# Patient Record
Sex: FEMALE | Race: WHITE | Employment: FULL TIME | URBAN - METROPOLITAN AREA
[De-identification: names, ages, dates, MRNs, and addresses within clinical notes are randomized per-mention and may not be internally consistent; named-entity substitution may affect disease eponyms.]

---

## 2018-04-06 LAB
CHLAMYDIA, EXTERNAL: NEGATIVE
HBSAG, EXTERNAL: NEGATIVE
HIV, EXTERNAL: NORMAL
N. GONORRHEA, EXTERNAL: NEGATIVE
RUBELLA, EXTERNAL: NORMAL

## 2018-08-14 LAB
ANTIBODY SCREEN, EXTERNAL: NEGATIVE
T. PALLIDUM, EXTERNAL: NEGATIVE

## 2018-10-03 LAB — GRBS, EXTERNAL: NEGATIVE

## 2018-11-02 ENCOUNTER — HOSPITAL ENCOUNTER (INPATIENT)
Age: 28
LOS: 3 days | Discharge: HOME | End: 2018-11-05
Attending: OBSTETRICS & GYNECOLOGY | Admitting: OBSTETRICS & GYNECOLOGY
Payer: COMMERCIAL

## 2018-11-02 ENCOUNTER — ANESTHESIA EVENT (OUTPATIENT)
Dept: LABOR AND DELIVERY | Age: 28
End: 2018-11-02
Payer: COMMERCIAL

## 2018-11-02 ENCOUNTER — ANESTHESIA (OUTPATIENT)
Dept: LABOR AND DELIVERY | Age: 28
End: 2018-11-02
Payer: COMMERCIAL

## 2018-11-02 DIAGNOSIS — Z98.891 HISTORY OF CESAREAN SECTION: Primary | ICD-10-CM

## 2018-11-02 PROBLEM — O24.419 GESTATIONAL DIABETES MELLITUS (GDM): Status: ACTIVE | Noted: 2018-11-02

## 2018-11-02 LAB
ABO + RH BLD: NORMAL
ALBUMIN SERPL-MCNC: 2.6 G/DL (ref 3.5–5)
ALBUMIN/GLOB SERPL: 0.7 {RATIO} (ref 1.1–2.2)
ALP SERPL-CCNC: 170 U/L (ref 45–117)
ALT SERPL-CCNC: 17 U/L (ref 12–78)
ANION GAP SERPL CALC-SCNC: 13 MMOL/L (ref 5–15)
AST SERPL-CCNC: 20 U/L (ref 15–37)
BASOPHILS # BLD: 0.1 K/UL (ref 0–0.1)
BASOPHILS NFR BLD: 1 % (ref 0–1)
BILIRUB SERPL-MCNC: 0.2 MG/DL (ref 0.2–1)
BLOOD GROUP ANTIBODIES SERPL: NORMAL
BUN SERPL-MCNC: 7 MG/DL (ref 6–20)
BUN/CREAT SERPL: 10 (ref 12–20)
CALCIUM SERPL-MCNC: 8.5 MG/DL (ref 8.5–10.1)
CHLORIDE SERPL-SCNC: 104 MMOL/L (ref 97–108)
CO2 SERPL-SCNC: 21 MMOL/L (ref 21–32)
CREAT SERPL-MCNC: 0.71 MG/DL (ref 0.55–1.02)
DIFFERENTIAL METHOD BLD: ABNORMAL
EOSINOPHIL # BLD: 0.4 K/UL (ref 0–0.4)
EOSINOPHIL NFR BLD: 4 % (ref 0–7)
ERYTHROCYTE [DISTWIDTH] IN BLOOD BY AUTOMATED COUNT: 15 % (ref 11.5–14.5)
GLOBULIN SER CALC-MCNC: 3.6 G/DL (ref 2–4)
GLUCOSE SERPL-MCNC: 83 MG/DL (ref 65–100)
HCT VFR BLD AUTO: 33 % (ref 35–47)
HGB BLD-MCNC: 11.1 G/DL (ref 11.5–16)
IMM GRANULOCYTES # BLD: 0.1 K/UL (ref 0–0.04)
IMM GRANULOCYTES NFR BLD AUTO: 1 % (ref 0–0.5)
LYMPHOCYTES # BLD: 1.7 K/UL (ref 0.8–3.5)
LYMPHOCYTES NFR BLD: 16 % (ref 12–49)
MCH RBC QN AUTO: 30.5 PG (ref 26–34)
MCHC RBC AUTO-ENTMCNC: 33.6 G/DL (ref 30–36.5)
MCV RBC AUTO: 90.7 FL (ref 80–99)
MONOCYTES # BLD: 0.8 K/UL (ref 0–1)
MONOCYTES NFR BLD: 8 % (ref 5–13)
NEUTS SEG # BLD: 7.3 K/UL (ref 1.8–8)
NEUTS SEG NFR BLD: 71 % (ref 32–75)
NRBC # BLD: 0 K/UL (ref 0–0.01)
NRBC BLD-RTO: 0 PER 100 WBC
PLATELET # BLD AUTO: 226 K/UL (ref 150–400)
PMV BLD AUTO: 9.9 FL (ref 8.9–12.9)
POTASSIUM SERPL-SCNC: 3.9 MMOL/L (ref 3.5–5.1)
PROT SERPL-MCNC: 6.2 G/DL (ref 6.4–8.2)
RBC # BLD AUTO: 3.64 M/UL (ref 3.8–5.2)
SODIUM SERPL-SCNC: 138 MMOL/L (ref 136–145)
SPECIMEN EXP DATE BLD: NORMAL
WBC # BLD AUTO: 10.4 K/UL (ref 3.6–11)

## 2018-11-02 PROCEDURE — 65270000029 HC RM PRIVATE

## 2018-11-02 PROCEDURE — 36415 COLL VENOUS BLD VENIPUNCTURE: CPT | Performed by: OBSTETRICS & GYNECOLOGY

## 2018-11-02 PROCEDURE — 77010026065 HC OXYGEN MINIMUM MEDICAL AIR: Performed by: OBSTETRICS & GYNECOLOGY

## 2018-11-02 PROCEDURE — 77030018836 HC SOL IRR NACL ICUM -A

## 2018-11-02 PROCEDURE — 77030034850

## 2018-11-02 PROCEDURE — 74011250636 HC RX REV CODE- 250/636: Performed by: ANESTHESIOLOGY

## 2018-11-02 PROCEDURE — 77030008459 HC STPLR SKN COOP -B

## 2018-11-02 PROCEDURE — 76060000078 HC EPIDURAL ANESTHESIA: Performed by: OBSTETRICS & GYNECOLOGY

## 2018-11-02 PROCEDURE — 76010000392 HC C SECN EA ADDL 0.5 HR: Performed by: OBSTETRICS & GYNECOLOGY

## 2018-11-02 PROCEDURE — 74011000258 HC RX REV CODE- 258: Performed by: OBSTETRICS & GYNECOLOGY

## 2018-11-02 PROCEDURE — 74011250636 HC RX REV CODE- 250/636

## 2018-11-02 PROCEDURE — 74011250636 HC RX REV CODE- 250/636: Performed by: OBSTETRICS & GYNECOLOGY

## 2018-11-02 PROCEDURE — 80053 COMPREHEN METABOLIC PANEL: CPT | Performed by: OBSTETRICS & GYNECOLOGY

## 2018-11-02 PROCEDURE — 10907ZC DRAINAGE OF AMNIOTIC FLUID, THERAPEUTIC FROM PRODUCTS OF CONCEPTION, VIA NATURAL OR ARTIFICIAL OPENING: ICD-10-PCS | Performed by: OBSTETRICS & GYNECOLOGY

## 2018-11-02 PROCEDURE — 75410000003 HC RECOV DEL/VAG/CSECN EA 0.5 HR: Performed by: OBSTETRICS & GYNECOLOGY

## 2018-11-02 PROCEDURE — 88307 TISSUE EXAM BY PATHOLOGIST: CPT

## 2018-11-02 PROCEDURE — 77030018809 HC RETRCTR ALXSO DISP AMR -B

## 2018-11-02 PROCEDURE — 86900 BLOOD TYPING SEROLOGIC ABO: CPT | Performed by: OBSTETRICS & GYNECOLOGY

## 2018-11-02 PROCEDURE — 77030007866 HC KT SPN ANES BBMI -B: Performed by: NURSE ANESTHETIST, CERTIFIED REGISTERED

## 2018-11-02 PROCEDURE — 74011000250 HC RX REV CODE- 250

## 2018-11-02 PROCEDURE — 76010000391 HC C SECN FIRST 1 HR: Performed by: OBSTETRICS & GYNECOLOGY

## 2018-11-02 PROCEDURE — 77030032490 HC SLV COMPR SCD KNE COVD -B

## 2018-11-02 PROCEDURE — 85025 COMPLETE CBC W/AUTO DIFF WBC: CPT | Performed by: OBSTETRICS & GYNECOLOGY

## 2018-11-02 RX ORDER — DOCUSATE SODIUM 100 MG/1
100 CAPSULE, LIQUID FILLED ORAL 2 TIMES DAILY
Status: DISCONTINUED | OUTPATIENT
Start: 2018-11-02 | End: 2018-11-05 | Stop reason: HOSPADM

## 2018-11-02 RX ORDER — KETOROLAC TROMETHAMINE 30 MG/ML
30 INJECTION, SOLUTION INTRAMUSCULAR; INTRAVENOUS
Status: DISPENSED | OUTPATIENT
Start: 2018-11-02 | End: 2018-11-03

## 2018-11-02 RX ORDER — FENTANYL CITRATE 50 UG/ML
INJECTION, SOLUTION INTRAMUSCULAR; INTRAVENOUS AS NEEDED
Status: DISCONTINUED | OUTPATIENT
Start: 2018-11-02 | End: 2018-11-02 | Stop reason: HOSPADM

## 2018-11-02 RX ORDER — LIDOCAINE HYDROCHLORIDE 10 MG/ML
INJECTION, SOLUTION EPIDURAL; INFILTRATION; INTRACAUDAL; PERINEURAL
Status: COMPLETED | OUTPATIENT
Start: 2018-11-02 | End: 2018-11-02

## 2018-11-02 RX ORDER — MORPHINE SULFATE 0.5 MG/ML
INJECTION, SOLUTION EPIDURAL; INTRATHECAL; INTRAVENOUS AS NEEDED
Status: DISCONTINUED | OUTPATIENT
Start: 2018-11-02 | End: 2018-11-02 | Stop reason: HOSPADM

## 2018-11-02 RX ORDER — DIPHENHYDRAMINE HCL 25 MG
25 CAPSULE ORAL
Status: DISCONTINUED | OUTPATIENT
Start: 2018-11-02 | End: 2018-11-05 | Stop reason: HOSPADM

## 2018-11-02 RX ORDER — OXYCODONE AND ACETAMINOPHEN 5; 325 MG/1; MG/1
1 TABLET ORAL
Status: DISCONTINUED | OUTPATIENT
Start: 2018-11-02 | End: 2018-11-05 | Stop reason: HOSPADM

## 2018-11-02 RX ORDER — PHENYLEPHRINE HCL IN 0.9% NACL 0.4MG/10ML
SYRINGE (ML) INTRAVENOUS AS NEEDED
Status: DISCONTINUED | OUTPATIENT
Start: 2018-11-02 | End: 2018-11-02 | Stop reason: HOSPADM

## 2018-11-02 RX ORDER — CLINDAMYCIN PHOSPHATE 900 MG/50ML
900 INJECTION, SOLUTION INTRAVENOUS ONCE
Status: COMPLETED | OUTPATIENT
Start: 2018-11-02 | End: 2018-11-02

## 2018-11-02 RX ORDER — SODIUM CHLORIDE, SODIUM LACTATE, POTASSIUM CHLORIDE, CALCIUM CHLORIDE 600; 310; 30; 20 MG/100ML; MG/100ML; MG/100ML; MG/100ML
1000 INJECTION, SOLUTION INTRAVENOUS CONTINUOUS
Status: DISCONTINUED | OUTPATIENT
Start: 2018-11-02 | End: 2018-11-02 | Stop reason: HOSPADM

## 2018-11-02 RX ORDER — SODIUM CHLORIDE 0.9 % (FLUSH) 0.9 %
5-10 SYRINGE (ML) INJECTION EVERY 8 HOURS
Status: DISCONTINUED | OUTPATIENT
Start: 2018-11-02 | End: 2018-11-05 | Stop reason: HOSPADM

## 2018-11-02 RX ORDER — IBUPROFEN 800 MG/1
800 TABLET ORAL EVERY 8 HOURS
Status: DISCONTINUED | OUTPATIENT
Start: 2018-11-02 | End: 2018-11-05 | Stop reason: HOSPADM

## 2018-11-02 RX ORDER — SIMETHICONE 80 MG
80 TABLET,CHEWABLE ORAL AS NEEDED
Status: DISCONTINUED | OUTPATIENT
Start: 2018-11-02 | End: 2018-11-05 | Stop reason: HOSPADM

## 2018-11-02 RX ORDER — OXYTOCIN/RINGER'S LACTATE 20/1000 ML
125-500 PLASTIC BAG, INJECTION (ML) INTRAVENOUS ONCE
Status: ACTIVE | OUTPATIENT
Start: 2018-11-02 | End: 2018-11-03

## 2018-11-02 RX ORDER — OXYTOCIN 10 [USP'U]/ML
INJECTION, SOLUTION INTRAMUSCULAR; INTRAVENOUS AS NEEDED
Status: DISCONTINUED | OUTPATIENT
Start: 2018-11-02 | End: 2018-11-02 | Stop reason: HOSPADM

## 2018-11-02 RX ORDER — ONDANSETRON 2 MG/ML
4 INJECTION INTRAMUSCULAR; INTRAVENOUS
Status: DISCONTINUED | OUTPATIENT
Start: 2018-11-02 | End: 2018-11-05 | Stop reason: HOSPADM

## 2018-11-02 RX ORDER — SODIUM CHLORIDE 0.9 % (FLUSH) 0.9 %
5-10 SYRINGE (ML) INJECTION AS NEEDED
Status: DISCONTINUED | OUTPATIENT
Start: 2018-11-02 | End: 2018-11-02 | Stop reason: HOSPADM

## 2018-11-02 RX ORDER — DIPHENHYDRAMINE HYDROCHLORIDE 50 MG/ML
25 INJECTION, SOLUTION INTRAMUSCULAR; INTRAVENOUS
Status: ACTIVE | OUTPATIENT
Start: 2018-11-02 | End: 2018-11-03

## 2018-11-02 RX ORDER — BUPIVACAINE HYDROCHLORIDE 7.5 MG/ML
INJECTION, SOLUTION EPIDURAL; RETROBULBAR AS NEEDED
Status: DISCONTINUED | OUTPATIENT
Start: 2018-11-02 | End: 2018-11-02 | Stop reason: HOSPADM

## 2018-11-02 RX ORDER — SODIUM CHLORIDE, SODIUM LACTATE, POTASSIUM CHLORIDE, CALCIUM CHLORIDE 600; 310; 30; 20 MG/100ML; MG/100ML; MG/100ML; MG/100ML
125 INJECTION, SOLUTION INTRAVENOUS CONTINUOUS
Status: DISCONTINUED | OUTPATIENT
Start: 2018-11-02 | End: 2018-11-05 | Stop reason: HOSPADM

## 2018-11-02 RX ORDER — SODIUM CHLORIDE 0.9 % (FLUSH) 0.9 %
5-10 SYRINGE (ML) INJECTION AS NEEDED
Status: DISCONTINUED | OUTPATIENT
Start: 2018-11-02 | End: 2018-11-05 | Stop reason: HOSPADM

## 2018-11-02 RX ORDER — ONDANSETRON 2 MG/ML
INJECTION INTRAMUSCULAR; INTRAVENOUS AS NEEDED
Status: DISCONTINUED | OUTPATIENT
Start: 2018-11-02 | End: 2018-11-02 | Stop reason: HOSPADM

## 2018-11-02 RX ORDER — NALOXONE HYDROCHLORIDE 0.4 MG/ML
0.4 INJECTION, SOLUTION INTRAMUSCULAR; INTRAVENOUS; SUBCUTANEOUS AS NEEDED
Status: DISCONTINUED | OUTPATIENT
Start: 2018-11-02 | End: 2018-11-05 | Stop reason: HOSPADM

## 2018-11-02 RX ORDER — SODIUM CHLORIDE 0.9 % (FLUSH) 0.9 %
5-10 SYRINGE (ML) INJECTION EVERY 8 HOURS
Status: DISCONTINUED | OUTPATIENT
Start: 2018-11-02 | End: 2018-11-02 | Stop reason: HOSPADM

## 2018-11-02 RX ADMIN — SODIUM CHLORIDE, SODIUM LACTATE, POTASSIUM CHLORIDE, AND CALCIUM CHLORIDE 1000 ML: 600; 310; 30; 20 INJECTION, SOLUTION INTRAVENOUS at 10:30

## 2018-11-02 RX ADMIN — Medication 80 MCG: at 12:19

## 2018-11-02 RX ADMIN — LIDOCAINE HYDROCHLORIDE 3 MG: 10 INJECTION, SOLUTION EPIDURAL; INFILTRATION; INTRACAUDAL; PERINEURAL at 12:06

## 2018-11-02 RX ADMIN — FENTANYL CITRATE 10 MCG: 50 INJECTION, SOLUTION INTRAMUSCULAR; INTRAVENOUS at 12:00

## 2018-11-02 RX ADMIN — ONDANSETRON 4 MG: 2 INJECTION INTRAMUSCULAR; INTRAVENOUS at 12:33

## 2018-11-02 RX ADMIN — MORPHINE SULFATE 200 MCG: 0.5 INJECTION, SOLUTION EPIDURAL; INTRATHECAL; INTRAVENOUS at 12:00

## 2018-11-02 RX ADMIN — KETOROLAC TROMETHAMINE 30 MG: 30 INJECTION, SOLUTION INTRAMUSCULAR at 15:36

## 2018-11-02 RX ADMIN — SODIUM CHLORIDE, SODIUM LACTATE, POTASSIUM CHLORIDE, AND CALCIUM CHLORIDE 125 ML/HR: 600; 310; 30; 20 INJECTION, SOLUTION INTRAVENOUS at 20:27

## 2018-11-02 RX ADMIN — Medication 80 MCG: at 13:00

## 2018-11-02 RX ADMIN — Medication 80 MCG: at 12:04

## 2018-11-02 RX ADMIN — BUPIVACAINE HYDROCHLORIDE 1.4 ML: 7.5 INJECTION, SOLUTION EPIDURAL; RETROBULBAR at 12:00

## 2018-11-02 RX ADMIN — CLINDAMYCIN PHOSPHATE 900 MG: 900 INJECTION, SOLUTION INTRAVENOUS at 11:50

## 2018-11-02 RX ADMIN — Medication 80 MCG: at 12:12

## 2018-11-02 RX ADMIN — Medication 80 MCG: at 13:08

## 2018-11-02 RX ADMIN — KETOROLAC TROMETHAMINE 30 MG: 30 INJECTION, SOLUTION INTRAMUSCULAR at 21:33

## 2018-11-02 RX ADMIN — SODIUM CHLORIDE, SODIUM LACTATE, POTASSIUM CHLORIDE, AND CALCIUM CHLORIDE 1000 ML: 600; 310; 30; 20 INJECTION, SOLUTION INTRAVENOUS at 11:32

## 2018-11-02 RX ADMIN — SODIUM CHLORIDE, SODIUM LACTATE, POTASSIUM CHLORIDE, AND CALCIUM CHLORIDE: 600; 310; 30; 20 INJECTION, SOLUTION INTRAVENOUS at 12:49

## 2018-11-02 RX ADMIN — Medication 80 MCG: at 12:25

## 2018-11-02 RX ADMIN — GENTAMICIN SULFATE 262 MG: 40 INJECTION, SOLUTION INTRAMUSCULAR; INTRAVENOUS at 12:03

## 2018-11-02 RX ADMIN — SODIUM CHLORIDE, SODIUM LACTATE, POTASSIUM CHLORIDE, AND CALCIUM CHLORIDE 1000 ML: 600; 310; 30; 20 INJECTION, SOLUTION INTRAVENOUS at 10:28

## 2018-11-02 RX ADMIN — Medication 80 MCG: at 12:05

## 2018-11-02 RX ADMIN — OXYTOCIN 30 UNITS: 10 INJECTION, SOLUTION INTRAMUSCULAR; INTRAVENOUS at 12:31

## 2018-11-02 NOTE — ANESTHESIA PROCEDURE NOTES
Spinal Block Start time: 11/2/2018 11:54 AM 
End time: 11/2/2018 12:02 PM 
Performed by: Wm Francois CRNA Authorized by: Jeff Ferrell MD  
 
Pre-procedure: 
Preanesthetic Checklist: patient identified, risks and benefits discussed, anesthesia consent, site marked, patient being monitored and timeout performed Timeout Time: 11:54 Spinal Block:  
Patient Position:  Seated Prep Region:  Lumbar Prep: DuraPrep Location:  L4-5 Technique:  Single shot Needle:  
Needle Type:  Pencan Needle Gauge:  25 G Attempts:  1 Events: CSF confirmed, no blood with aspiration and no paresthesia Assessment: 
Insertion:  Uncomplicated Patient tolerance:  Patient tolerated the procedure well with no immediate complications

## 2018-11-02 NOTE — ANESTHESIA POSTPROCEDURE EVALUATION
Procedure(s):  SECTION. Anesthesia Post Evaluation Patient location during evaluation: bedside Patient participation: complete - patient participated Level of consciousness: awake Pain management: adequate Airway patency: patent Anesthetic complications: no 
Cardiovascular status: acceptable Respiratory status: acceptable Hydration status: acceptable Visit Vitals /77 Pulse 75 Temp 36.4 °C (97.5 °F) Resp 20 Ht 5' 3\" (1.6 m) Wt 78 kg (172 lb) SpO2 100% Breastfeeding? Unknown BMI 30.47 kg/m²

## 2018-11-02 NOTE — ROUTINE PROCESS
TRANSFER - IN REPORT: 
 
Verbal report received from Lux Malin RN(name) on Светлана Purdy  being received from L&D(unit) for routine progression of care Report consisted of patients Situation, Background, Assessment and  
Recommendations(SBAR). Information from the following report(s) SBAR, Kardex, Intake/Output, MAR and Recent Results was reviewed with the receiving nurse. Opportunity for questions and clarification was provided. Assessment completed upon patients arrival to unit and care assumed.

## 2018-11-02 NOTE — PROGRESS NOTES
Nutrition Brief Note: 
 
Received [facility] referral for gestational DM. Patient admitted for  delivery. Pre-op preparations started and patient received spinal block. Per H&P, BG levels well controlled this pregnancy. Patient is preparing for delivery so nutrition interventions for gestational DM are not indicated at this time. Will continue to follow per protocol. Amador Stanton, RD Pager [phone] Office [phone]
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
## 2018-11-02 NOTE — ANESTHESIA PREPROCEDURE EVALUATION
Anesthetic History No history of anesthetic complications Review of Systems / Medical History Patient summary reviewed, nursing notes reviewed and pertinent labs reviewed Pulmonary Within defined limits Neuro/Psych Within defined limits Cardiovascular Within defined limits GI/Hepatic/Renal 
Within defined limits Endo/Other Within defined limits Other Findings Comments: 39weeks 4 days, GDM, repeat C section Physical Exam 
 
Airway Mallampati: II 
 
Neck ROM: normal range of motion Mouth opening: Normal 
 
 Cardiovascular Rhythm: regular Rate: normal 
 
 
 
 Dental 
No notable dental hx Pulmonary Breath sounds clear to auscultation Abdominal 
GI exam deferred Other Findings Anesthetic Plan ASA: 2 Anesthesia type: spinal 
 
 
 
 
 
Anesthetic plan and risks discussed with: Patient and Family

## 2018-11-02 NOTE — L&D DELIVERY NOTE
Delivery Summary  Patient: Dee Collado             Circumcision:   desires  Additional Delivery Comments - Uncomplicated repeat  section - see operative note for details. Information for the patient's :  Arcola Litten, Male [[id number]]       Labor Events:    Labor: No   Rupture Date: 2018   Rupture Time: 12:30 PM   Rupture Type AROM   Amniotic Fluid Volume:      Amniotic Fluid Description: Clear None   Induction: None       Augmentation: None   Labor Events: None     Cervical Ripening:     None     Delivery Events:  Episiotomy: None   Laceration(s): None     Repaired: None    Number of Repair Packets:     Suture Type and Size: None     Estimated Blood Loss (ml):  ml       Delivery Date: 2018    Delivery Time: 12:30 PM  Delivery Type: , Low Transverse  Sex:  Male     Gestational Age: 43w3d   Delivery Clinician:  Kelly Monet  Living Status: Living   Delivery Location: L&D  OR OR #1          APGARS  One minute Five minutes Ten minutes   Skin color: 1   1        Heart rate: 2   2        Grimace: 2   2        Muscle tone: 2   2        Breathin   2        Totals: 9   9            Presentation: Vertex    Position:        Resuscitation Method:  Tactile Stimulation;Suctioning-bulb     Meconium Stained: None      Cord Information: 3 Vessels  Complications: None  Cord around:    Delayed cord clamping?  Yes  Cord clamped date/time:2018 12:31 PM  Disposition of Cord Blood: Lab    Blood Gases Sent?: No    Placenta:  Date/Time: 2018 12:33 PM  Removal: Manual Removal      Appearance: Normal;Intact     [place] Measurements:  Birth Weight: 4.05 kg      Birth Length: 48.3 cm      Head Circumference: 37 cm      Chest Circumference: 34.5 cm     Abdominal Girth: 35.5 cm    Other Providers:   Fozia CASSIDY KIMBERLY;YINKA FOOTE;CASSIDY CARLSON;NATHAN RIDLEY;PARAM THOMAS, Obstetrician;Primary Nurse;Primary [place] Nurse;Crna;Tech;Tech;Charge Nurse           Cord pH:  none    Episiotomy: None   Laceration(s): None     Estimated Blood Loss (ml): 800 mL    Labor Events  Method: None      Augmentation: None   Cervical Ripening:       None        Hospital Problems  Date Reviewed: 2018          Codes Class Noted POA    Gestational diabetes mellitus (GDM) ICD-10-CM: O24.419  ICD-9-CM: 648.80  2018 Unknown        History of  section ICD-10-CM: Z98.891  ICD-9-CM: V45.89  2018 Unknown        Pregnancy ICD-10-CM: Z34.90  ICD-9-CM: V22.2  2016 Unknown              Operative Vaginal Delivery - none    Group B Strep:   Lab Results   Component Value Date/Time    GrBStrep, External NEGATIVE 2016     Information for the patient's :  Darek Parent, Male [[id number]]   No results found for: ABORH, PCTABR, PCTDIG, BILI, ABORHEXT, ABORH    No results found for: APH, APCO2, APO2, AHCO3, ABEC, ABDC, O2ST, EPHV, PCO2V, PO2V, HCO3V, EBEV, EBDV, SITE, RSCOM

## 2018-11-02 NOTE — ROUTINE PROCESS
Bedside and Verbal shift change report given to [facility] (oncoming nurse) by Michelle Irby RNSHANTELLE (offgoing nurse). Report included the following information SBAR, Kardex, Intake/Output, MAR and Recent Results.

## 2018-11-02 NOTE — LACTATION NOTE
Problem: Lactation Care Plan Goal: *Infant latching appropriately Outcome: Progressing Towards Goal 
Mom comfortable and relaxed with breast feeding. States nursed for about 19 months. STates baby nursed for about an hour after delivery. Not seen at breast. 
  
Pt will successfully establish breastfeeding by feeding in response to early feeding cues  
or wake every 3h, will obtain deep latch, and will keep log of feedings/output. Taught to BF at hunger cues and or q 2-3 hrs and to offer 10-20 drops of hand expressed colostrum at any non-feeds.   
  
Breast Assessment Left Breast: Large Left Nipple: Everted, Intact Right Breast: Large Right Nipple: Everted, Intact Breast- Feeding Assessment Attends Breast-Feeding Classes: No 
Breast-Feeding Experience: Yes(19 months with first) Breast Trauma/Surgery: No 
Type/Quality: Good(per mom, not seen at breast) 
  
  
Goal: *Weight loss less than 10% of birth weight Outcome: Progressing Towards Goal 
  
Encouraged mom to attempt feeding with baby led feeding cues. Just as sucking on fingers, rooting, mouthing. Looking for 8-12 feedings in 24 hours. Don't limit baby at breast, allow baby to come of breast on it's own. Baby may want to feed  often and may increase number of feedings on second day of life. Skin to skin encouraged.  
  
 If baby doesn't nurse,  Mom should  hand express  10-20 drops of colostrum and drip into baby's mouth, or give to baby by finger feeding, cup feeding, or spoon feeding at least every 2-3 hours.  
  
  
Problem: Patient Education: Go to Patient Education Activity Goal: Patient/Family Education Outcome: Progressing Towards Goal 
Reviewed breastfeeding basics:  Supply and demand,  stomach size, early  Feeding cues, skin to skin, positioning and baby led latch-on, assymetrical latch with signs of good, deep latch vs shallow, feeding frequency and duration, and log sheet for tracking infant feedings and output. Breastfeeding Booklet and Warm line information given. Discussed typical  weight loss and the importance of infant weight checks with pediatrician 1-2 post discharge. 
  
Discussed with mother her plan for feeding. Reviewed the benefits of exclusive breast milk feeding during the hospital stay. Informed her of the risks of using formula to supplement in the first few days of life as well as the benefits of successful breast milk feeding; referred her to the Breastfeeding booklet about this information. She acknowledges understanding of information reviewed and states that it is her plan to breast feed her infant. Will support her choice and offer additional information as needed

## 2018-11-02 NOTE — PROGRESS NOTES
1005 Patient arrived for scheduled repeat  section. [address] on EFM at this time. 1025 IV initiated by RN. LR bolus started. [address] [facility] [address]. Nikko Maria with anesthesia at bedside for pre-op consult. [address] [facility] reviewed and signed with patient. 1051 Removed from EFM- NST reactive. 1109 Chlorhexidine wipes used over abdomen at this time. Patient performed own hair removal at home. 65 Dr. Darlene Lu at bedside for pre-op consult. 1153 Ambulatory to OR for scheduled . 1312 Transferred back to LDR room 204 for recovery. 1401 Patient doing well. Family at bedside. 1521 Patient doing well. Tolerating PO fluids and clears. Would like some crackers- has good bowel sounds. 1653 Bedside and Verbal shift change report given to NERY Faust (oncoming nurse) by Salazar Kemp RN (offgoing nurse). Report included the following information SBAR, Kardex, Procedure Summary, Intake/Output, MAR, Recent Results and Med Rec Status.

## 2018-11-02 NOTE — OP NOTES
Operative Note    Name: Gogo El Record Number: [id number]   YOB: 1990  Today's Date: 2018      Pre-operative Diagnosis:  1. H/o prior  section, desires repeat  section    2. GDMA2, insulin controlled    Post-operative Diagnosis:Same as above with extremely thin lower uterine segment noted    Operation: low transverse  section Procedure(s):   SECTION    Surgeon(s):  Blanca Woods MD    Anesthesia: Spinal    Prophylactic Antibiotics: Gentamicin and clindamycin  DVT Prophylaxis: Sequential Compression Devices         Fetal Description: nj     Birth Information:   Information for the patient's :  Rosy Zimmerman, Male [[id number]]   Delivery of a 4.05 kg Male [2] infant on 2018 at 12:30 PM. Apgars were 9 and 9. Umbilical Cord:     Umbilical Cord Events:     Placenta:  removal with  appearance. Amniotic Fluid Volume:        Amniotic Fluid Description:  Clear          Placenta:  expressed Expressed    Estimated Blood Loss (ml):   800 mL    Specimens: Placenta           Complications:  Extremely thin lower uterine segment    Procedure Detail:      After proper patient identification and consent, the patient was taken to the operating room, where spinal anesthesia was administered and found to be adequate. Hull catheter had been placed using sterile technique. The patient was prepped and draped in the normal sterile fashion. The abdomen was entered using the Pfannenstiel technique. The peritoneum was entered sharply well superior to the bladder without any apparent injury. The bladder flap was created. A low transverse uterine incision was made with the scalpel and the lower uterine segment was noted to be extremely thin, only 2mm thick, and extended with blunt finger dissection. Amniotomy was performed and the fluid was medium amount clear. The babys head was then delivered atraumatically.   The cord was clamped and cut and the baby was handed off to Nursing staff in attendance. Placenta was then removed from the uterus. The uterus was curettaged with a moist lap pad and cleared of all clots and debris. The uterine incision was closed with 0 monocryl, double layer  in running locking fashion with good hemostasis assured. Good hemostasis was again reassured. The rectus muscles were reapproximated with 2-0 vicryl. The fascia was closed with 0 Vicryl in a running fashion. Good hemostasis was assured. The skin was closed with an absorbable staple closure and steri strips. The patient tolerated the procedure well. Sponge, lap, and needle counts were correct times three and the patient and baby were taken to recovery/postpartum room in stable condition.     Nimisha Soriano MD  November 2, 2018  1:11 PM

## 2018-11-03 LAB
BASOPHILS # BLD: 0.1 K/UL (ref 0–0.1)
BASOPHILS NFR BLD: 1 % (ref 0–1)
DIFFERENTIAL METHOD BLD: ABNORMAL
EOSINOPHIL # BLD: 0.4 K/UL (ref 0–0.4)
EOSINOPHIL NFR BLD: 3 % (ref 0–7)
ERYTHROCYTE [DISTWIDTH] IN BLOOD BY AUTOMATED COUNT: 15 % (ref 11.5–14.5)
HCT VFR BLD AUTO: 32.2 % (ref 35–47)
HGB BLD-MCNC: 10.5 G/DL (ref 11.5–16)
IMM GRANULOCYTES # BLD: 0.1 K/UL (ref 0–0.04)
IMM GRANULOCYTES NFR BLD AUTO: 1 % (ref 0–0.5)
LYMPHOCYTES # BLD: 1.6 K/UL (ref 0.8–3.5)
LYMPHOCYTES NFR BLD: 14 % (ref 12–49)
MCH RBC QN AUTO: 30.3 PG (ref 26–34)
MCHC RBC AUTO-ENTMCNC: 32.6 G/DL (ref 30–36.5)
MCV RBC AUTO: 92.8 FL (ref 80–99)
MONOCYTES # BLD: 1.1 K/UL (ref 0–1)
MONOCYTES NFR BLD: 9 % (ref 5–13)
NEUTS SEG # BLD: 8.6 K/UL (ref 1.8–8)
NEUTS SEG NFR BLD: 73 % (ref 32–75)
NRBC # BLD: 0 K/UL (ref 0–0.01)
NRBC BLD-RTO: 0 PER 100 WBC
PLATELET # BLD AUTO: 195 K/UL (ref 150–400)
PMV BLD AUTO: 10.3 FL (ref 8.9–12.9)
RBC # BLD AUTO: 3.47 M/UL (ref 3.8–5.2)
WBC # BLD AUTO: 11.8 K/UL (ref 3.6–11)

## 2018-11-03 PROCEDURE — 77030018846 HC SOL IRR STRL H20 ICUM -A

## 2018-11-03 PROCEDURE — 65270000029 HC RM PRIVATE

## 2018-11-03 PROCEDURE — 74011250637 HC RX REV CODE- 250/637: Performed by: OBSTETRICS & GYNECOLOGY

## 2018-11-03 PROCEDURE — 85025 COMPLETE CBC W/AUTO DIFF WBC: CPT | Performed by: OBSTETRICS & GYNECOLOGY

## 2018-11-03 PROCEDURE — 36415 COLL VENOUS BLD VENIPUNCTURE: CPT | Performed by: OBSTETRICS & GYNECOLOGY

## 2018-11-03 RX ADMIN — SIMETHICONE CHEW TAB 80 MG 80 MG: 80 TABLET ORAL at 20:11

## 2018-11-03 RX ADMIN — IBUPROFEN 800 MG: 800 TABLET ORAL at 20:11

## 2018-11-03 RX ADMIN — OXYCODONE AND ACETAMINOPHEN 1 TABLET: 5; 325 TABLET ORAL at 20:11

## 2018-11-03 RX ADMIN — OXYCODONE AND ACETAMINOPHEN 1 TABLET: 5; 325 TABLET ORAL at 12:04

## 2018-11-03 RX ADMIN — IBUPROFEN 800 MG: 800 TABLET ORAL at 04:12

## 2018-11-03 RX ADMIN — OXYCODONE AND ACETAMINOPHEN 1 TABLET: 5; 325 TABLET ORAL at 04:14

## 2018-11-03 RX ADMIN — OXYCODONE AND ACETAMINOPHEN 1 TABLET: 5; 325 TABLET ORAL at 16:20

## 2018-11-03 RX ADMIN — DOCUSATE SODIUM 100 MG: 100 CAPSULE, LIQUID FILLED ORAL at 08:15

## 2018-11-03 RX ADMIN — OXYCODONE AND ACETAMINOPHEN 1 TABLET: 5; 325 TABLET ORAL at 08:15

## 2018-11-03 RX ADMIN — DOCUSATE SODIUM 100 MG: 100 CAPSULE, LIQUID FILLED ORAL at 20:11

## 2018-11-03 RX ADMIN — IBUPROFEN 800 MG: 800 TABLET ORAL at 12:03

## 2018-11-03 NOTE — ROUTINE PROCESS
Bedside and Verbal shift change report given to Kassandra Best RN (oncoming nurse) by Rachell Sims RN (offgoing nurse). Report given with SBAR, Kardex, Intake/Output and MAR.

## 2018-11-03 NOTE — ROUTINE PROCESS
Bedside and Verbal shift change report given to Mili Bucio RN (oncoming nurse) by Angeles Aviles RN (offgoing nurse). Report included the following information SBAR.

## 2018-11-03 NOTE — PROGRESS NOTES
Post-Operative  Day 1 [facility] Information for the patient's :  Suzanne Henderson, Male [[id number]] , Low Transverse Patient doing well without unusual complaints. She ambulates, voids and tolerates po. She desires and is on track for a POD #2 discharge. Vitals: 
Visit Vitals /59 (BP 1 Location: Right arm, BP Patient Position: At rest) Pulse 67 Temp 98 °F (36.7 °C) Resp 16 Ht 5' 3\" (1.6 m) Wt 78 kg (172 lb) SpO2 100% Breastfeeding? Unknown BMI 30.47 kg/m² Temp (24hrs), Av.9 °F (36.6 °C), Min:97.5 °F (36.4 °C), Max:98.1 °F (36.7 °C) Last 24hr Input/Output: 
 
Intake/Output Summary (Last 24 hours) at 11/3/2018 0636 Last data filed at 11/3/2018 0220 Gross per 24 hour Intake 3200 ml Output 4200 ml Net -1000 ml Exam:   
   Patient without distress. Abdomen:soft, expected mild tenderness, fundus firm @U-2 Inc: dressing c/d/i Lower extremities are no tenderness with 1+ edema. Labs:  
Lab Results Component Value Date/Time WBC 11.8 (H) 2018 05:32 AM  
 WBC 10.4 2018 10:30 AM  
 HGB 10.5 (L) 2018 05:32 AM  
 HGB 11.1 (L) 2018 10:30 AM  
 HCT 32.2 (L) 2018 05:32 AM  
 HCT 33.0 (L) 2018 10:30 AM  
 PLATELET 590  05:32 AM  
 PLATELET 801 6431 10:30 AM  
 
 
Recent Results (from the past 24 hour(s)) CBC WITH AUTOMATED DIFF Collection Time: 18 10:30 AM  
Result Value Ref Range WBC 10.4 3.6 - 11.0 K/uL  
 RBC 3.64 (L) 3.80 - 5.20 M/uL  
 HGB 11.1 (L) 11.5 - 16.0 g/dL HCT 33.0 (L) 35.0 - 47.0 % MCV 90.7 80.0 - 99.0 FL  
 MCH 30.5 26.0 - 34.0 PG  
 MCHC 33.6 30.0 - 36.5 g/dL  
 RDW 15.0 (H) 11.5 - 14.5 % PLATELET 776 584 - 204 K/uL MPV 9.9 8.9 - 12.9 FL  
 NRBC 0.0 0  WBC ABSOLUTE NRBC 0.00 0.00 - 0.01 K/uL NEUTROPHILS 71 32 - 75 % LYMPHOCYTES 16 12 - 49 % MONOCYTES 8 5 - 13 % EOSINOPHILS 4 0 - 7 % BASOPHILS 1 0 - 1 % IMMATURE GRANULOCYTES 1 (H) 0.0 - 0.5 % ABS. NEUTROPHILS 7.3 1.8 - 8.0 K/UL  
 ABS. LYMPHOCYTES 1.7 0.8 - 3.5 K/UL  
 ABS. MONOCYTES 0.8 0.0 - 1.0 K/UL  
 ABS. EOSINOPHILS 0.4 0.0 - 0.4 K/UL  
 ABS. BASOPHILS 0.1 0.0 - 0.1 K/UL  
 ABS. IMM. GRANS. 0.1 (H) 0.00 - 0.04 K/UL  
 DF AUTOMATED    
TYPE & SCREEN Collection Time: 11/02/18 10:30 AM  
Result Value Ref Range Crossmatch Expiration 11/05/2018 ABO/Rh(D) O POSITIVE Antibody screen NEG METABOLIC PANEL, COMPREHENSIVE Collection Time: 11/02/18 10:32 AM  
Result Value Ref Range Sodium 138 136 - 145 mmol/L Potassium 3.9 3.5 - 5.1 mmol/L Chloride 104 97 - 108 mmol/L  
 CO2 21 21 - 32 mmol/L Anion gap 13 5 - 15 mmol/L Glucose 83 65 - 100 mg/dL BUN 7 6 - 20 MG/DL Creatinine 0.71 0.55 - 1.02 MG/DL  
 BUN/Creatinine ratio 10 (L) 12 - 20 GFR est AA >60 >60 ml/min/1.73m2 GFR est non-AA >60 >60 ml/min/1.73m2 Calcium 8.5 8.5 - 10.1 MG/DL Bilirubin, total 0.2 0.2 - 1.0 MG/DL  
 ALT (SGPT) 17 12 - 78 U/L  
 AST (SGOT) 20 15 - 37 U/L Alk. phosphatase 170 (H) 45 - 117 U/L Protein, total 6.2 (L) 6.4 - 8.2 g/dL Albumin 2.6 (L) 3.5 - 5.0 g/dL Globulin 3.6 2.0 - 4.0 g/dL A-G Ratio 0.7 (L) 1.1 - 2.2    
CBC WITH AUTOMATED DIFF Collection Time: 11/03/18  5:32 AM  
Result Value Ref Range WBC 11.8 (H) 3.6 - 11.0 K/uL  
 RBC 3.47 (L) 3.80 - 5.20 M/uL  
 HGB 10.5 (L) 11.5 - 16.0 g/dL HCT 32.2 (L) 35.0 - 47.0 % MCV 92.8 80.0 - 99.0 FL  
 MCH 30.3 26.0 - 34.0 PG  
 MCHC 32.6 30.0 - 36.5 g/dL  
 RDW 15.0 (H) 11.5 - 14.5 % PLATELET 563 027 - 295 K/uL MPV 10.3 8.9 - 12.9 FL  
 NRBC 0.0 0  WBC ABSOLUTE NRBC 0.00 0.00 - 0.01 K/uL NEUTROPHILS 73 32 - 75 % LYMPHOCYTES 14 12 - 49 % MONOCYTES 9 5 - 13 % EOSINOPHILS 3 0 - 7 % BASOPHILS 1 0 - 1 % IMMATURE GRANULOCYTES 1 (H) 0.0 - 0.5 % ABS. NEUTROPHILS 8.6 (H) 1.8 - 8.0 K/UL  
 ABS. LYMPHOCYTES 1.6 0.8 - 3.5 K/UL ABS. MONOCYTES 1.1 (H) 0.0 - 1.0 K/UL  
 ABS. EOSINOPHILS 0.4 0.0 - 0.4 K/UL  
 ABS. BASOPHILS 0.1 0.0 - 0.1 K/UL  
 ABS. IMM. GRANS. 0.1 (H) 0.00 - 0.04 K/UL  
 DF AUTOMATED Assessment: Post-Op day 1, stable; O+/RI Plan:   1. Routine post-operative care, advance and ambulate. 2. Male infant: Circ desired and will perform POD#2 3. Patient targeting a POD#2 discharge.

## 2018-11-04 PROCEDURE — 74011250637 HC RX REV CODE- 250/637: Performed by: OBSTETRICS & GYNECOLOGY

## 2018-11-04 PROCEDURE — 65270000029 HC RM PRIVATE

## 2018-11-04 PROCEDURE — 77030036554

## 2018-11-04 RX ORDER — ONDANSETRON 4 MG/1
4 TABLET, ORALLY DISINTEGRATING ORAL
Status: DISCONTINUED | OUTPATIENT
Start: 2018-11-04 | End: 2018-11-05 | Stop reason: HOSPADM

## 2018-11-04 RX ORDER — IBUPROFEN 800 MG/1
800 TABLET ORAL EVERY 8 HOURS
Qty: 40 TAB | Refills: 0 | Status: SHIPPED | OUTPATIENT
Start: 2018-11-04

## 2018-11-04 RX ORDER — ACETAMINOPHEN 325 MG/1
650 TABLET ORAL
Status: DISCONTINUED | OUTPATIENT
Start: 2018-11-04 | End: 2018-11-05 | Stop reason: HOSPADM

## 2018-11-04 RX ORDER — OXYCODONE AND ACETAMINOPHEN 5; 325 MG/1; MG/1
1 TABLET ORAL
Qty: 20 TAB | Refills: 0 | Status: SHIPPED | OUTPATIENT
Start: 2018-11-04

## 2018-11-04 RX ADMIN — OXYCODONE AND ACETAMINOPHEN 1 TABLET: 5; 325 TABLET ORAL at 00:11

## 2018-11-04 RX ADMIN — ONDANSETRON 4 MG: 4 TABLET, ORALLY DISINTEGRATING ORAL at 11:09

## 2018-11-04 RX ADMIN — SIMETHICONE CHEW TAB 80 MG 80 MG: 80 TABLET ORAL at 03:15

## 2018-11-04 RX ADMIN — IBUPROFEN 800 MG: 800 TABLET ORAL at 12:04

## 2018-11-04 RX ADMIN — IBUPROFEN 800 MG: 800 TABLET ORAL at 20:04

## 2018-11-04 RX ADMIN — IBUPROFEN 800 MG: 800 TABLET ORAL at 03:15

## 2018-11-04 RX ADMIN — OXYCODONE AND ACETAMINOPHEN 1 TABLET: 5; 325 TABLET ORAL at 03:15

## 2018-11-04 RX ADMIN — ACETAMINOPHEN 650 MG: 325 TABLET, FILM COATED ORAL at 20:04

## 2018-11-04 RX ADMIN — ACETAMINOPHEN 650 MG: 325 TABLET, FILM COATED ORAL at 15:26

## 2018-11-04 RX ADMIN — OXYCODONE AND ACETAMINOPHEN 1 TABLET: 5; 325 TABLET ORAL at 07:20

## 2018-11-04 NOTE — PROGRESS NOTES
1104: Patient called out stating she felt nauseous. PO Zofran given. Will continue to monitor 1200: Patient called out wanting motrin for pain. This RN asked if patient has eaten recently since she was previously feeling nauseous. Patient stated she had not. This RN gave patient saltine crackers. Patient at one packet of crackers and Reba Salmon, RN gave patient Motrin. 1250: Patient called out stating she vomited. This RN gave patient a cool rag. Temperature of patient was orally taken and was 98.4. Patient stated she felt cramping before she vomited. This RN gave patient chicken broth for patient to sip on. Will page MD.   
1310: Spoke with Dr. Karlene Russell regarding patient's nausea and vomiting. Per Dr. Karlene Russell patient needs to get up and walk the hallway. No new orders for medicines received. Per Dr. Karlene Russell patient can still leave today if desired. 1330: Patient sipping on chicken broth. This RN informed patient what Dr. Soler Brought stated. Patient acknowledges and agrees. This RN gave patient appleasauce, apple juice/water. 1400:  Patient walking hallway with spouse. Patient stated it was hard to stand up straight but felt ok to walk. No more episodes of nausea at this time. Patient stated she would like to have more chicken broth and a banana if possible. Will continue to monitor.

## 2018-11-04 NOTE — PROGRESS NOTES
Post-Operative  Day 2 [facility] Information for the patient's :  Ene Mckinney, Male [[id number]] , Low Transverse Patient doing well without unusual complaints. Some cramping uterine pain. Desires circ and discharge home today. Vitals: 
Visit Vitals /75 (BP 1 Location: Right leg, BP Patient Position: At rest) Pulse 83 Temp 98.2 °F (36.8 °C) Resp 16 Ht 5' 3\" (1.6 m) Wt 78 kg (172 lb) SpO2 100% Breastfeeding? Unknown BMI 30.47 kg/m² Temp (24hrs), Av.1 °F (36.7 °C), Min:97.7 °F (36.5 °C), Max:98.3 °F (36.8 °C) Exam:   
  Patient without distress. Abdomen: soft, expected tenderness, fundus firm Incision clean, dry and intact; Steris Lower extremities are nontender with trace edema. Labs:  
Lab Results Component Value Date/Time WBC 11.8 (H) 2018 05:32 AM  
 WBC 10.4 2018 10:30 AM  
 HGB 10.5 (L) 2018 05:32 AM  
 HGB 11.1 (L) 2018 10:30 AM  
 HCT 32.2 (L) 2018 05:32 AM  
 HCT 33.0 (L) 2018 10:30 AM  
 PLATELET 739 7244 05:32 AM  
 PLATELET 416 /10/8554 10:30 AM  
 
 
No results found for this or any previous visit (from the past 24 hour(s)). Assessment: Post-Op day 2, doing well; O+/RI Plan: 1. Routine post-operative care 2. Circ done 3. Desires discharge home today

## 2018-11-04 NOTE — DISCHARGE SUMMARY
Patient ID:  Dariela Traore  [id number]  88 y.o.  1990    Admit Date: 2018    Discharge Date: 2018     Admitting Physician: Ida Lynne MD  Attending Physician: Kentrell Michel MD    Admission Diagnoses:   1. Term Pregnancy  2. Prior LTCS for R LTCS  3. A2GDM    Discharge Diagnoses: Same as above with R LTCS producing a viable infant. Information for the patient's :  Dandy Alarcon Male [[id number]]        Additional Diagnoses: Gestational DM. Maternal Labs:   Lab Results   Component Value Date/Time    HBsAg, External Negative 2018    HIV, External Non-reactive 2018    Rubella, External Immune 2018    GrBStrep, External Negative 10/03/2018       Cord Blood Results:   Information for the patient's :  Lit Alatorre [[id number]]     Lab Results   Component Value Date/Time    ABO/Rh(D) O POSITIVE 2018 01:25 PM    ARIADNE IgG NEG 2018 01:25 PM    Bilirubin if ARIADNE pos: IF DIRECT CASIMIRO POSITIVE, BILIRUBIN TO FOLLOW 2018 01:25 PM           History of Present Illness:   OB History      Para Term  AB Living    2 2 2     2    SAB TAB Ectopic Molar Multiple Live Births            0 2        Admitted for  Section. Hospital Course:   Patient was admitted as above and delivered via R LTCS. Please the chart for details. The postpartum course was unremarkable. She was deemed stable for discharge home on day 2 per patient desire.         Signed:  Cy Rangel MD  2018  9:17 AM

## 2018-11-04 NOTE — ROUTINE PROCESS
Bedside and Verbal shift change report given to Modesto Carrel, RN (oncoming nurse) by Guillermina Mishra RN (offgoing nurse). Report included the following information SBAR.

## 2018-11-05 VITALS
HEART RATE: 77 BPM | WEIGHT: 172 LBS | TEMPERATURE: 98.2 F | HEIGHT: 63 IN | RESPIRATION RATE: 16 BRPM | BODY MASS INDEX: 30.48 KG/M2 | SYSTOLIC BLOOD PRESSURE: 111 MMHG | OXYGEN SATURATION: 100 % | DIASTOLIC BLOOD PRESSURE: 70 MMHG

## 2018-11-05 PROCEDURE — 74011250637 HC RX REV CODE- 250/637: Performed by: OBSTETRICS & GYNECOLOGY

## 2018-11-05 RX ORDER — ONDANSETRON 4 MG/1
4 TABLET, ORALLY DISINTEGRATING ORAL
Qty: 20 TAB | Refills: 0 | Status: SHIPPED | OUTPATIENT
Start: 2018-11-05

## 2018-11-05 RX ADMIN — ACETAMINOPHEN 650 MG: 325 TABLET, FILM COATED ORAL at 00:18

## 2018-11-05 RX ADMIN — ACETAMINOPHEN 650 MG: 325 TABLET, FILM COATED ORAL at 04:11

## 2018-11-05 RX ADMIN — ACETAMINOPHEN 650 MG: 325 TABLET, FILM COATED ORAL at 08:08

## 2018-11-05 RX ADMIN — IBUPROFEN 800 MG: 800 TABLET ORAL at 04:11

## 2018-11-05 NOTE — DISCHARGE SUMMARY
Obstetrical Discharge Summary     Name: Liyah Christianson MRN: [id number]  SSN: [id number]    YOB: 1990  Age: 32 y.o. Sex: female      Admit Date: 2018    Discharge Date: 2018     Admitting Physician: Layla Plummer MD     Attending Physician:  Kiersten Flaherty MD     Admission Diagnoses: Repeat  Pregnancy    Discharge Diagnoses:   Information for the patient's :  Jordana Doll, Male [[id number]]   Delivery of a 4.05 kg male infant via , Low Transverse on 2018 at 12:30 PM  by . Apgars were 9 and 9. Additional Diagnoses:   Hospital Problems  Date Reviewed: 2018          Codes Class Noted POA    Gestational diabetes mellitus (GDM) ICD-10-CM: O24.419  ICD-9-CM: 648.80  2018 Unknown        History of  section ICD-10-CM: Z98.891  ICD-9-CM: V45.89  2018 Unknown        Pregnancy ICD-10-CM: Z34.90  ICD-9-CM: V22.2  2016 Unknown             Lab Results   Component Value Date/Time    Rubella, External Immune 2018    GrBStrep, External Negative 10/03/2018       Hospital Course: Patient had issues with postpartum nausea on POD2 that resolved. She was discharge on POD3. Disposition at Discharge: Home or self care    Discharged Condition: Stable    Patient Instructions:   Current Discharge Medication List      CONTINUE these medications which have CHANGED    Details   ibuprofen (MOTRIN) 800 mg tablet Take 1 Tab by mouth every eight (8) hours. Qty: 40 Tab, Refills: 0      oxyCODONE-acetaminophen (PERCOCET) 5-325 mg per tablet Take 1 Tab by mouth every four (4) hours as needed. Max Daily Amount: 6 Tabs. Qty: 20 Tab, Refills: 0    Associated Diagnoses: History of  section         CONTINUE these medications which have NOT CHANGED    Details   PRENATAL VIT CALC,IRON,FOLIC (PRENATAL VITAMIN PO) Take  by mouth.          STOP taking these medications       Iron BisGl &PS [id number] mg-mg-mcg-mg cap Comments:   Reason for Stopping: insulin NPH (NOVOLIN N NPH U-100 INSULIN) 100 unit/mL injection Comments:   Reason for Stopping:         loratadine (CLARITIN) 10 mg tablet Comments:   Reason for Stopping:               Reference my discharge instructions. Follow-up Appointments   Procedures    FOLLOW UP VISIT Appointment in: Two Weeks     Standing Status:   Standing     Number of Occurrences:   1     Order Specific Question:   Appointment in     Answer:    Two Weeks        Signed By:  Mickie Barfield MD     November 5, 2018

## 2018-11-05 NOTE — DISCHARGE INSTRUCTIONS
POST DELIVERY DISCHARGE INSTRUCTIONS    Name: Ava Gould  YOB: 1990  Primary Diagnosis: Active Problems:    Pregnancy (2016)      Gestational diabetes mellitus (GDM) (2018)      History of  section (2018)        General:     Diet/Diet Restrictions:  Eight 8-ounce glasses of fluid daily (water, juices); avoid excessive caffeine intake. Meals/snacks as desired which are high in fiber and carbohydrates and low in fat and cholesterol. Physical Activity / Restrictions / Safety:     Avoid heavy lifting, no more that 8 lbs. For 2-3 weeks; limit use of stairs to 2 times daily for the first week home. No driving for one week. Avoid intercourse 4-6 weeks, no douching or tampon use. Check with obstetrician before starting or resuming an exercise program.         Discharge Instructions/Special Treatment/Home Care Needs:     Continue prenatal vitamins. Continue to use squirt bottle with warm water on your episiotomy after each bathroom use until bleeding stops. If steri-strips applied to your incision, remove in 7-10 days. Call your doctor for the following:     Fever over 101 degrees by mouth. Vaginal bleeding heavier than a normal menstrual period or clot larger than a golf ball. Red streaks or increased swelling of legs, painful red streaks on your breast.  Painful urination, constipation and increased pain or swelling or discharge with your incision. If you feel extremely anxious or overwhelmed. If you have thoughts of harming yourself and/or your baby. Pain Management:     Pain Management:   Take Acetaminophen (Tylenol) or Ibuprofen (Advil, Motrin), as directed for pain. Use a warm Sitz bath 3 times daily to relieve episiotomy or hemorrhoidal discomfort. Heating pad to  incision as needed. For hemorrhoidal discomfort, use Tucks and Anusol cream as needed and directed. Follow-Up Care:      These are general instructions for a healthy lifestyle:    No smoking/ No tobacco products/ Avoid exposure to second hand smoke    Surgeon General's Warning:  Quitting smoking now greatly reduces serious risk to your health. Obesity, smoking, and sedentary lifestyle greatly increases your risk for illness    A healthy diet, regular physical exercise & weight monitoring are important for maintaining a healthy lifestyle    Recognize signs and symptoms of STROKE:    F-face looks uneven    A-arms unable to move or move unevenly    S-speech slurred or non-existent    T-time-call 911 as soon as signs and symptoms begin-DO NOT go       Back to bed or wait to see if you get better-TIME IS BRAIN. Signed By: Sravan Blunt RN                                                                                                   Date: 11/5/2018 Time: 9:59 AM         Depression After Childbirth: Care Instructions  Your Care Instructions    Many women get the \"baby blues\" during the first few days after childbirth. You may lose sleep, feel irritable, and cry easily. You may feel happy one minute and sad the next. Hormone changes are one cause of these emotional changes. Also, the demands of a new baby, along with visits from relatives or other family needs, add to a mother's stress. The \"baby blues\" often peak around the fourth day. Then they ease up in less than 2 weeks. If your moodiness or anxiety lasts for more than 2 weeks, or if you feel like life is not worth living, you may have postpartum depression. This is different for each mother. Some mothers with serious depression may worry intensely about their infant's well-being. Others may feel distant from their child. Some mothers might even feel that they might harm their baby. A mother may have signs of paranoia, wondering if someone is watching her. Depression is not a sign of weakness. It is a medical condition that requires treatment. Medicine and counseling often work well to reduce depression.  Talk to your doctor about taking antidepressant medicine while breastfeeding. Follow-up care is a key part of your treatment and safety. Be sure to make and go to all appointments, and call your doctor if you are having problems. It's also a good idea to know your test results and keep a list of the medicines you take. How do you know if you are depressed? With all the changes in your life, you may not know if you are depressed. Pregnancy sometimes causes changes in how you feel that are similar to the symptoms of depression. Symptoms of depression include:  · Feeling sad or hopeless and losing interest in daily activities. These are the most common symptoms of depression. · Sleeping too much or not enough. · Feeling tired. You may feel as if you have no energy. · Eating too much or too little. · Writing or talking about death, such as writing suicide notes or talking about guns, knives, or pills. Keep the numbers for these national suicide hotlines: 1-456-852-TALK (7-901.438.2250) and 9-767-PCGIFEP (3-394.817.6593). If you or someone you know talks about suicide or feeling hopeless, get help right away. How can you care for yourself at home? · Be safe with medicines. Take your medicines exactly as prescribed. Call your doctor if you think you are having a problem with your medicine. · Eat a healthy diet so that you can keep up your energy. · Get regular daily exercise, such as walks, to help improve your mood. · Get as much sunlight as possible. Keep your shades and curtains open. Get outside as much as you can. · Avoid using alcohol or other substances to feel better. · Get as much rest and sleep as possible. Avoid doing too much. Being too tired can increase depression. · Play stimulating music throughout your day and soothing music at night. · Schedule outings and visits with friends and family. Ask them to call you regularly, so that you do not feel alone.   · Ask for help with preparing food and other daily tasks. Family and friends are often happy to help a mother with a . · Be honest with yourself and those who care about you. Tell them about your struggle. · Join a support group of new mothers. No one can better understand the challenges of caring for a  than other new mothers. · If you feel like life is not worth living or are feeling hopeless, get help right away. Keep the numbers for these national suicide hotlines: 2-220-010-TALK (1-985.326.2208) and 6-216-CLZLCXL (6-320.231.9035). When should you call for help? Call 911 anytime you think you may need emergency care. For example, call if:    · You feel you cannot stop from hurting yourself, your baby, or someone else.   [facility] your doctor now or seek immediate medical care if:    · You are having trouble caring for yourself or your baby.     · You hear voices.   Graciela Ropes closely for changes in your health, and be sure to contact your doctor if:    · You have problems with your depression medicine.     · You do not get better as expected. Where can you learn more? Go to http://eliz-ellen.info/. Enter E143 in the search box to learn more about \"Depression After Childbirth: Care Instructions. \"  Current as of: 2017  Content Version: 11.8  © 8897-1850 [employer]. Care instructions adapted under license by [employer] (which disclaims liability or warranty for this information). If you have questions about a medical condition or this instruction, always ask your healthcare professional. [address] any warranty or liability for your use of this information.

## 2018-11-05 NOTE — PROGRESS NOTES
Post-Operative  Day 3 Zbigniew Valdez Assessment: Post-Op day 3, doing well Postoperative nausea GDM- diet controlled, needs 6-12 week 2hr GTT Plan: 1. Discharge home today 2. Follow up in office in 6 weeks with Marco Suh MD 
3. Post partum activity/wound care advised, diet as tolerated 4. Discharge Medications: ibuprofen, percocet and medications prior to admission Information for the patient's :  Rina Victor, Male [[id number]] , Low Transverse Patient doing well without significant complaint. Tolerating diet, passing flatus, voiding and ambulating without difficulty Vitals: 
Visit Vitals /70 (BP 1 Location: Right arm, BP Patient Position: At rest) Pulse 77 Temp 98.2 °F (36.8 °C) Resp 16 Ht 5' 3\" (1.6 m) Wt 78 kg (172 lb) SpO2 100% Breastfeeding? Unknown BMI 30.47 kg/m² Temp (24hrs), Av.1 °F (36.7 °C), Min:98 °F (36.7 °C), Max:98.2 °F (36.8 °C) Exam:   
    Patient without distress. Abdomen, bowel sounds present, soft, expected tenderness, fundus firm Wound incision clean, dry and intact Lower extremities are negative for swelling, cords or tenderness. Labs:  
Lab Results Component Value Date/Time WBC 11.8 (H) 2018 05:32 AM  
 WBC 10.4 2018 10:30 AM  
 WBC 18.1 (H) 2016 04:09 AM  
 WBC 10.2 2016 05:38 AM  
 HGB 10.5 (L) 2018 05:32 AM  
 HGB 11.1 (L) 2018 10:30 AM  
 HGB 9.5 (L) 2016 04:09 AM  
 HGB 10.7 (L) 2016 05:38 AM  
 HCT 32.2 (L) 2018 05:32 AM  
 HCT 33.0 (L) 2018 10:30 AM  
 HCT 28.3 (L) 2016 04:09 AM  
 HCT 32.3 (L) 2016 05:38 AM  
 PLATELET 688  05:32 AM  
 PLATELET 577  10:30 AM  
 PLATELET 669  04:09 AM  
 PLATELET 567  05:38 AM  
 Hgb, External 11.3 2016 Hct, External 33.7 2016 Platelet cnt., External 288 06/29/2016 No results found for this or any previous visit (from the past 24 hour(s)).

## 2018-11-05 NOTE — PROGRESS NOTES
11/5/2018 
0720  Pt states she is feeling good and is excited to be going home today. Pt states she still has soft stool and does not desire her stool softener today. 10:09 AM 
Spoke with Carlo Benites  With Dr Kasie Mcmillan. TORB received pt may be discharged today with follow up in 2 weeks with Dr. Annie White. Pt is to call Dr. Annie White next week regarding restarting Iron medication. Pt may take a break this week due to recent episodes of nausea. Patient off unit in stable condition via wheelchair with nurse for discharge home per  Dr. Kasie Mcmillan. Pt is to follow-up in 2 weeks and is aware. Prescriptions given. Patient denies any HA, Dizziness, N/V or pain at this time. Infant placed in car seat by mother. Discharge teaching completed and discharge instructions signed and given to patients without any further questions at this time.

## 2018-11-05 NOTE — ROUTINE PROCESS
Bedside and Verbal shift change report given to [address] [place] [address] (oncoming nurse) by Asael Bustillos RN (offgoing nurse). Report included the following information SBAR, Kardex, Intake/Output, MAR and Accordion.

## 2020-08-27 ENCOUNTER — EMPLOYEE WELLNESS (OUTPATIENT)
Dept: OTHER | Facility: CLINIC | Age: 30
End: 2020-08-27

## 2020-08-27 LAB
CHOLEST SERPL-MCNC: 177 MG/DL
GLUCOSE SERPL-MCNC: 81 MG/DL (ref 65–100)
HDLC SERPL-MCNC: 70 MG/DL
LDLC SERPL CALC-MCNC: 96.2 MG/DL (ref 0–100)
TRIGL SERPL-MCNC: 54 MG/DL (ref ?–150)
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
## 2022-01-18 ENCOUNTER — OFFICE VISIT (OUTPATIENT)
Dept: ORTHOPEDIC SURGERY | Age: 32
End: 2022-01-18
Payer: COMMERCIAL

## 2022-01-18 VITALS — HEIGHT: 62 IN | BODY MASS INDEX: 29.26 KG/M2 | WEIGHT: 159 LBS

## 2022-01-18 DIAGNOSIS — S83.512A RUPTURE OF ANTERIOR CRUCIATE LIGAMENT OF LEFT KNEE, INITIAL ENCOUNTER: ICD-10-CM

## 2022-01-18 DIAGNOSIS — G89.29 CHRONIC PAIN OF LEFT KNEE: Primary | ICD-10-CM

## 2022-01-18 DIAGNOSIS — M25.562 PAIN OF LEFT KNEE AFTER INJURY: ICD-10-CM

## 2022-01-18 DIAGNOSIS — M25.562 CHRONIC PAIN OF LEFT KNEE: Primary | ICD-10-CM

## 2022-01-18 DIAGNOSIS — S83.242A ACUTE MEDIAL MENISCAL TEAR, LEFT, INITIAL ENCOUNTER: ICD-10-CM

## 2022-01-18 PROCEDURE — 99204 OFFICE O/P NEW MOD 45 MIN: CPT | Performed by: ORTHOPAEDIC SURGERY
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
## 2022-02-03 ENCOUNTER — OFFICE VISIT (OUTPATIENT)
Dept: ORTHOPEDIC SURGERY | Age: 32
End: 2022-02-03
Payer: COMMERCIAL

## 2022-02-03 VITALS — WEIGHT: 160 LBS | BODY MASS INDEX: 29.44 KG/M2 | HEIGHT: 62 IN

## 2022-02-03 DIAGNOSIS — S83.242A ACUTE MEDIAL MENISCAL TEAR, LEFT, INITIAL ENCOUNTER: ICD-10-CM

## 2022-02-03 DIAGNOSIS — M94.262 CHONDROMALACIA OF LEFT KNEE: ICD-10-CM

## 2022-02-03 DIAGNOSIS — M62.81 QUADRICEPS WEAKNESS: ICD-10-CM

## 2022-02-03 DIAGNOSIS — M25.562 CHRONIC PAIN OF LEFT KNEE: Primary | ICD-10-CM

## 2022-02-03 DIAGNOSIS — G89.29 CHRONIC PAIN OF LEFT KNEE: Primary | ICD-10-CM

## 2022-02-03 DIAGNOSIS — S83.512A RUPTURE OF ANTERIOR CRUCIATE LIGAMENT OF LEFT KNEE, INITIAL ENCOUNTER: Primary | ICD-10-CM

## 2022-02-03 DIAGNOSIS — M25.562 PAIN OF LEFT KNEE AFTER INJURY: ICD-10-CM

## 2022-02-03 PROCEDURE — 99213 OFFICE O/P EST LOW 20 MIN: CPT | Performed by: ORTHOPAEDIC SURGERY
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
## 2022-03-03 ENCOUNTER — OFFICE VISIT (OUTPATIENT)
Dept: ORTHOPEDIC SURGERY | Age: 32
End: 2022-03-03
Payer: COMMERCIAL

## 2022-03-03 VITALS — BODY MASS INDEX: 29.44 KG/M2 | WEIGHT: 160 LBS | HEIGHT: 62 IN

## 2022-03-03 DIAGNOSIS — M25.562 PAIN OF LEFT KNEE AFTER INJURY: ICD-10-CM

## 2022-03-03 DIAGNOSIS — G89.29 CHRONIC PAIN OF LEFT KNEE: Primary | ICD-10-CM

## 2022-03-03 DIAGNOSIS — M62.81 QUADRICEPS WEAKNESS: ICD-10-CM

## 2022-03-03 DIAGNOSIS — M25.562 CHRONIC PAIN OF LEFT KNEE: Primary | ICD-10-CM

## 2022-03-03 DIAGNOSIS — M94.262 CHONDROMALACIA OF LEFT KNEE: ICD-10-CM

## 2022-03-03 PROCEDURE — 99214 OFFICE O/P EST MOD 30 MIN: CPT | Performed by: ORTHOPAEDIC SURGERY

## 2022-03-19 PROBLEM — O24.419 GESTATIONAL DIABETES MELLITUS (GDM): Status: ACTIVE | Noted: 2018-11-02

## 2022-03-19 PROBLEM — Z98.891 HISTORY OF CESAREAN SECTION: Status: ACTIVE | Noted: 2018-11-02

## 2023-05-11 RX ORDER — ONDANSETRON 4 MG/1
TABLET, ORALLY DISINTEGRATING ORAL EVERY 6 HOURS PRN
COMMUNITY
Start: 2018-11-05

## 2023-05-11 RX ORDER — IBUPROFEN 800 MG/1
TABLET ORAL EVERY 8 HOURS
COMMUNITY
Start: 2018-11-04

## 2023-05-11 RX ORDER — OXYCODONE HYDROCHLORIDE AND ACETAMINOPHEN 5; 325 MG/1; MG/1
1 TABLET ORAL EVERY 4 HOURS PRN
COMMUNITY
Start: 2018-11-04

## 2023-08-04 LAB
C. TRACHOMATIS, EXTERNAL RESULT: NEGATIVE
HEP B, EXTERNAL RESULT: NEGATIVE
N. GONORRHOEAE, EXTERNAL RESULT: NEGATIVE
RUBELLA TITER, EXTERNAL RESULT: NORMAL

## 2023-08-24 LAB — HIV, EXTERNAL RESULT: NON REACTIVE

## 2023-12-13 LAB — RPR, EXTERNAL RESULT: NON REACTIVE

## 2024-02-08 LAB — GBS, EXTERNAL RESULT: NEGATIVE

## 2024-02-28 ENCOUNTER — ANESTHESIA EVENT (OUTPATIENT)
Facility: HOSPITAL | Age: 34
End: 2024-02-28

## 2024-02-29 ENCOUNTER — HOSPITAL ENCOUNTER (OUTPATIENT)
Facility: HOSPITAL | Age: 34
Discharge: HOME | End: 2024-02-29
Attending: OBSTETRICS & GYNECOLOGY | Admitting: OBSTETRICS & GYNECOLOGY
Payer: COMMERCIAL

## 2024-02-29 VITALS
HEART RATE: 92 BPM | OXYGEN SATURATION: 98 % | SYSTOLIC BLOOD PRESSURE: 123 MMHG | RESPIRATION RATE: 16 BRPM | WEIGHT: 181 LBS | BODY MASS INDEX: 33.31 KG/M2 | DIASTOLIC BLOOD PRESSURE: 78 MMHG | HEIGHT: 62 IN | TEMPERATURE: 97.9 F

## 2024-02-29 LAB
BASOPHILS # BLD: 0.1 K/UL (ref 0–0.1)
BASOPHILS NFR BLD: 1 % (ref 0–1)
DIFFERENTIAL METHOD BLD: ABNORMAL
EOSINOPHIL # BLD: 0.3 K/UL (ref 0–0.4)
EOSINOPHIL NFR BLD: 3 % (ref 0–7)
ERYTHROCYTE [DISTWIDTH] IN BLOOD BY AUTOMATED COUNT: 14.7 % (ref 11.5–14.5)
HCT VFR BLD AUTO: 33.6 % (ref 35–47)
HGB BLD-MCNC: 11.5 G/DL (ref 11.5–16)
IMM GRANULOCYTES # BLD AUTO: 0.1 K/UL (ref 0–0.04)
IMM GRANULOCYTES NFR BLD AUTO: 1 % (ref 0–0.5)
LYMPHOCYTES # BLD: 1.4 K/UL (ref 0.8–3.5)
LYMPHOCYTES NFR BLD: 15 % (ref 12–49)
MCH RBC QN AUTO: 32.2 PG (ref 26–34)
MCHC RBC AUTO-ENTMCNC: 34.2 G/DL (ref 30–36.5)
MCV RBC AUTO: 94.1 FL (ref 80–99)
MONOCYTES # BLD: 0.7 K/UL (ref 0–1)
MONOCYTES NFR BLD: 7 % (ref 5–13)
NEUTS SEG # BLD: 7.3 K/UL (ref 1.8–8)
NEUTS SEG NFR BLD: 73 % (ref 32–75)
NRBC # BLD: 0 K/UL (ref 0–0.01)
NRBC BLD-RTO: 0 PER 100 WBC
PLATELET # BLD AUTO: 222 K/UL (ref 150–400)
PMV BLD AUTO: 9.6 FL (ref 8.9–12.9)
RBC # BLD AUTO: 3.57 M/UL (ref 3.8–5.2)
WBC # BLD AUTO: 9.9 K/UL (ref 3.6–11)

## 2024-02-29 PROCEDURE — 86850 RBC ANTIBODY SCREEN: CPT

## 2024-02-29 PROCEDURE — 86901 BLOOD TYPING SEROLOGIC RH(D): CPT

## 2024-02-29 PROCEDURE — 86780 TREPONEMA PALLIDUM: CPT

## 2024-02-29 PROCEDURE — 85025 COMPLETE CBC W/AUTO DIFF WBC: CPT

## 2024-02-29 PROCEDURE — 86900 BLOOD TYPING SEROLOGIC ABO: CPT

## 2024-02-29 PROCEDURE — 36415 COLL VENOUS BLD VENIPUNCTURE: CPT

## 2024-03-01 ENCOUNTER — HOSPITAL ENCOUNTER (INPATIENT)
Facility: HOSPITAL | Age: 34
LOS: 2 days | Discharge: HOME | End: 2024-03-03
Attending: OBSTETRICS & GYNECOLOGY | Admitting: OBSTETRICS & GYNECOLOGY
Payer: COMMERCIAL

## 2024-03-01 ENCOUNTER — ANESTHESIA (OUTPATIENT)
Facility: HOSPITAL | Age: 34
End: 2024-03-01

## 2024-03-01 DIAGNOSIS — Z98.891 S/P CESAREAN SECTION: Primary | ICD-10-CM

## 2024-03-01 LAB
ABO + RH BLD: NORMAL
BLOOD GROUP ANTIBODIES SERPL: NORMAL
GLUCOSE BLD STRIP.AUTO-MCNC: 82 MG/DL (ref 65–117)
SERVICE CMNT-IMP: NORMAL
SPECIMEN EXP DATE BLD: NORMAL
T PALLIDUM AB SER QL IA: NON REACTIVE

## 2024-03-01 PROCEDURE — 2580000003 HC RX 258: Performed by: OBSTETRICS & GYNECOLOGY

## 2024-03-01 PROCEDURE — 6360000002 HC RX W HCPCS: Performed by: ANESTHESIOLOGY

## 2024-03-01 PROCEDURE — 6360000002 HC RX W HCPCS: Performed by: NURSE ANESTHETIST, CERTIFIED REGISTERED

## 2024-03-01 PROCEDURE — 3609079900 HC CESAREAN SECTION: Performed by: OBSTETRICS & GYNECOLOGY

## 2024-03-01 PROCEDURE — A4216 STERILE WATER/SALINE, 10 ML: HCPCS | Performed by: OBSTETRICS & GYNECOLOGY

## 2024-03-01 PROCEDURE — 6360000002 HC RX W HCPCS: Performed by: OBSTETRICS & GYNECOLOGY

## 2024-03-01 PROCEDURE — 3700000000 HC ANESTHESIA ATTENDED CARE: Performed by: OBSTETRICS & GYNECOLOGY

## 2024-03-01 PROCEDURE — 2500000003 HC RX 250 WO HCPCS: Performed by: OBSTETRICS & GYNECOLOGY

## 2024-03-01 PROCEDURE — 3700000001 HC ADD 15 MINUTES (ANESTHESIA): Performed by: OBSTETRICS & GYNECOLOGY

## 2024-03-01 PROCEDURE — 6370000000 HC RX 637 (ALT 250 FOR IP): Performed by: OBSTETRICS & GYNECOLOGY

## 2024-03-01 PROCEDURE — 82962 GLUCOSE BLOOD TEST: CPT

## 2024-03-01 PROCEDURE — 2580000003 HC RX 258: Performed by: NURSE ANESTHETIST, CERTIFIED REGISTERED

## 2024-03-01 PROCEDURE — 4A1HXCZ MONITORING OF PRODUCTS OF CONCEPTION, CARDIAC RATE, EXTERNAL APPROACH: ICD-10-PCS | Performed by: OBSTETRICS & GYNECOLOGY

## 2024-03-01 PROCEDURE — 7100000000 HC PACU RECOVERY - FIRST 15 MIN: Performed by: OBSTETRICS & GYNECOLOGY

## 2024-03-01 PROCEDURE — 1120000000 HC RM PRIVATE OB

## 2024-03-01 PROCEDURE — 7100000001 HC PACU RECOVERY - ADDTL 15 MIN: Performed by: OBSTETRICS & GYNECOLOGY

## 2024-03-01 PROCEDURE — 2709999900 HC NON-CHARGEABLE SUPPLY: Performed by: OBSTETRICS & GYNECOLOGY

## 2024-03-01 RX ORDER — SODIUM CHLORIDE, SODIUM LACTATE, POTASSIUM CHLORIDE, AND CALCIUM CHLORIDE .6; .31; .03; .02 G/100ML; G/100ML; G/100ML; G/100ML
1000 INJECTION, SOLUTION INTRAVENOUS ONCE
Status: DISCONTINUED | OUTPATIENT
Start: 2024-03-01 | End: 2024-03-02

## 2024-03-01 RX ORDER — SODIUM CHLORIDE, SODIUM LACTATE, POTASSIUM CHLORIDE, CALCIUM CHLORIDE 600; 310; 30; 20 MG/100ML; MG/100ML; MG/100ML; MG/100ML
INJECTION, SOLUTION INTRAVENOUS CONTINUOUS
Status: DISCONTINUED | OUTPATIENT
Start: 2024-03-01 | End: 2024-03-01 | Stop reason: SDUPTHER

## 2024-03-01 RX ORDER — FENTANYL CITRATE 50 UG/ML
INJECTION, SOLUTION INTRAMUSCULAR; INTRAVENOUS
Status: COMPLETED | OUTPATIENT
Start: 2024-03-01 | End: 2024-03-01

## 2024-03-01 RX ORDER — ACETAMINOPHEN 325 MG/1
650 TABLET ORAL EVERY 6 HOURS
Status: DISCONTINUED | OUTPATIENT
Start: 2024-03-01 | End: 2024-03-01 | Stop reason: SDUPTHER

## 2024-03-01 RX ORDER — BUPIVACAINE HYDROCHLORIDE 7.5 MG/ML
INJECTION, SOLUTION INTRASPINAL
Status: COMPLETED | OUTPATIENT
Start: 2024-03-01 | End: 2024-03-01

## 2024-03-01 RX ORDER — ONDANSETRON 2 MG/ML
INJECTION INTRAMUSCULAR; INTRAVENOUS PRN
Status: DISCONTINUED | OUTPATIENT
Start: 2024-03-01 | End: 2024-03-01 | Stop reason: SDUPTHER

## 2024-03-01 RX ORDER — ONDANSETRON 4 MG/1
4 TABLET, ORALLY DISINTEGRATING ORAL EVERY 8 HOURS PRN
Status: DISCONTINUED | OUTPATIENT
Start: 2024-03-01 | End: 2024-03-03 | Stop reason: HOSPADM

## 2024-03-01 RX ORDER — SODIUM CHLORIDE 9 MG/ML
INJECTION, SOLUTION INTRAVENOUS PRN
Status: DISCONTINUED | OUTPATIENT
Start: 2024-03-01 | End: 2024-03-03 | Stop reason: HOSPADM

## 2024-03-01 RX ORDER — SODIUM CHLORIDE, SODIUM LACTATE, POTASSIUM CHLORIDE, AND CALCIUM CHLORIDE .6; .31; .03; .02 G/100ML; G/100ML; G/100ML; G/100ML
500 INJECTION, SOLUTION INTRAVENOUS ONCE
Status: DISCONTINUED | OUTPATIENT
Start: 2024-03-01 | End: 2024-03-02

## 2024-03-01 RX ORDER — MORPHINE SULFATE 1 MG/ML
INJECTION, SOLUTION EPIDURAL; INTRATHECAL; INTRAVENOUS
Status: COMPLETED | OUTPATIENT
Start: 2024-03-01 | End: 2024-03-01

## 2024-03-01 RX ORDER — ONDANSETRON 2 MG/ML
4 INJECTION INTRAMUSCULAR; INTRAVENOUS EVERY 6 HOURS PRN
Status: DISCONTINUED | OUTPATIENT
Start: 2024-03-01 | End: 2024-03-01 | Stop reason: SDUPTHER

## 2024-03-01 RX ORDER — KETOROLAC TROMETHAMINE 30 MG/ML
INJECTION, SOLUTION INTRAMUSCULAR; INTRAVENOUS PRN
Status: DISCONTINUED | OUTPATIENT
Start: 2024-03-01 | End: 2024-03-01 | Stop reason: SDUPTHER

## 2024-03-01 RX ORDER — ONDANSETRON 2 MG/ML
4 INJECTION INTRAMUSCULAR; INTRAVENOUS EVERY 6 HOURS PRN
Status: DISCONTINUED | OUTPATIENT
Start: 2024-03-01 | End: 2024-03-03 | Stop reason: HOSPADM

## 2024-03-01 RX ORDER — KETOROLAC TROMETHAMINE 30 MG/ML
30 INJECTION, SOLUTION INTRAMUSCULAR; INTRAVENOUS EVERY 6 HOURS
Status: DISCONTINUED | OUTPATIENT
Start: 2024-03-01 | End: 2024-03-02

## 2024-03-01 RX ORDER — SODIUM CHLORIDE 0.9 % (FLUSH) 0.9 %
10 SYRINGE (ML) INJECTION PRN
Status: DISCONTINUED | OUTPATIENT
Start: 2024-03-01 | End: 2024-03-02

## 2024-03-01 RX ORDER — DOCUSATE SODIUM 100 MG/1
100 CAPSULE, LIQUID FILLED ORAL 2 TIMES DAILY
Status: DISCONTINUED | OUTPATIENT
Start: 2024-03-01 | End: 2024-03-03 | Stop reason: HOSPADM

## 2024-03-01 RX ORDER — IBUPROFEN 400 MG/1
800 TABLET ORAL EVERY 8 HOURS SCHEDULED
Status: DISCONTINUED | OUTPATIENT
Start: 2024-03-01 | End: 2024-03-03 | Stop reason: HOSPADM

## 2024-03-01 RX ORDER — OXYCODONE HYDROCHLORIDE 5 MG/1
5 TABLET ORAL EVERY 4 HOURS PRN
Status: DISCONTINUED | OUTPATIENT
Start: 2024-03-01 | End: 2024-03-02

## 2024-03-01 RX ORDER — SODIUM CHLORIDE, SODIUM LACTATE, POTASSIUM CHLORIDE, CALCIUM CHLORIDE 600; 310; 30; 20 MG/100ML; MG/100ML; MG/100ML; MG/100ML
INJECTION, SOLUTION INTRAVENOUS CONTINUOUS PRN
Status: DISCONTINUED | OUTPATIENT
Start: 2024-03-01 | End: 2024-03-01 | Stop reason: SDUPTHER

## 2024-03-01 RX ORDER — SODIUM CHLORIDE 0.9 % (FLUSH) 0.9 %
5-40 SYRINGE (ML) INJECTION PRN
Status: DISCONTINUED | OUTPATIENT
Start: 2024-03-01 | End: 2024-03-03 | Stop reason: HOSPADM

## 2024-03-01 RX ORDER — OXYCODONE HYDROCHLORIDE 5 MG/1
5 TABLET ORAL EVERY 4 HOURS PRN
Status: DISCONTINUED | OUTPATIENT
Start: 2024-03-01 | End: 2024-03-03 | Stop reason: HOSPADM

## 2024-03-01 RX ORDER — SODIUM CHLORIDE 0.9 % (FLUSH) 0.9 %
5-40 SYRINGE (ML) INJECTION EVERY 12 HOURS SCHEDULED
Status: DISCONTINUED | OUTPATIENT
Start: 2024-03-01 | End: 2024-03-01 | Stop reason: SDUPTHER

## 2024-03-01 RX ORDER — MODIFIED LANOLIN
OINTMENT (GRAM) TOPICAL
Status: DISCONTINUED | OUTPATIENT
Start: 2024-03-01 | End: 2024-03-03 | Stop reason: HOSPADM

## 2024-03-01 RX ORDER — ACETAMINOPHEN 325 MG/1
975 TABLET ORAL ONCE
Status: COMPLETED | OUTPATIENT
Start: 2024-03-01 | End: 2024-03-01

## 2024-03-01 RX ORDER — SWAB
1 SWAB, NON-MEDICATED MISCELLANEOUS DAILY
Status: DISCONTINUED | OUTPATIENT
Start: 2024-03-01 | End: 2024-03-03 | Stop reason: HOSPADM

## 2024-03-01 RX ORDER — SODIUM CHLORIDE, SODIUM LACTATE, POTASSIUM CHLORIDE, CALCIUM CHLORIDE 600; 310; 30; 20 MG/100ML; MG/100ML; MG/100ML; MG/100ML
INJECTION, SOLUTION INTRAVENOUS CONTINUOUS
Status: DISCONTINUED | OUTPATIENT
Start: 2024-03-01 | End: 2024-03-02

## 2024-03-01 RX ORDER — DIPHENHYDRAMINE HYDROCHLORIDE 50 MG/ML
25 INJECTION INTRAMUSCULAR; INTRAVENOUS EVERY 6 HOURS PRN
Status: DISCONTINUED | OUTPATIENT
Start: 2024-03-01 | End: 2024-03-02

## 2024-03-01 RX ORDER — NALOXONE HYDROCHLORIDE 0.4 MG/ML
INJECTION, SOLUTION INTRAMUSCULAR; INTRAVENOUS; SUBCUTANEOUS PRN
Status: DISCONTINUED | OUTPATIENT
Start: 2024-03-01 | End: 2024-03-02

## 2024-03-01 RX ORDER — DIPHENHYDRAMINE HCL 25 MG
25 CAPSULE ORAL EVERY 6 HOURS PRN
Status: DISCONTINUED | OUTPATIENT
Start: 2024-03-01 | End: 2024-03-01 | Stop reason: SDUPTHER

## 2024-03-01 RX ORDER — SODIUM CHLORIDE 9 MG/ML
INJECTION, SOLUTION INTRAVENOUS PRN
Status: DISCONTINUED | OUTPATIENT
Start: 2024-03-01 | End: 2024-03-02

## 2024-03-01 RX ORDER — DIPHENHYDRAMINE HYDROCHLORIDE 50 MG/ML
25 INJECTION INTRAMUSCULAR; INTRAVENOUS EVERY 6 HOURS PRN
Status: DISCONTINUED | OUTPATIENT
Start: 2024-03-01 | End: 2024-03-01 | Stop reason: SDUPTHER

## 2024-03-01 RX ORDER — SODIUM CHLORIDE 0.9 % (FLUSH) 0.9 %
5-40 SYRINGE (ML) INJECTION EVERY 12 HOURS SCHEDULED
Status: DISCONTINUED | OUTPATIENT
Start: 2024-03-01 | End: 2024-03-02

## 2024-03-01 RX ORDER — ACETAMINOPHEN 500 MG
1000 TABLET ORAL EVERY 8 HOURS SCHEDULED
Status: DISCONTINUED | OUTPATIENT
Start: 2024-03-01 | End: 2024-03-03 | Stop reason: HOSPADM

## 2024-03-01 RX ORDER — DIPHENHYDRAMINE HCL 25 MG
25 CAPSULE ORAL EVERY 6 HOURS PRN
Status: DISCONTINUED | OUTPATIENT
Start: 2024-03-01 | End: 2024-03-03 | Stop reason: HOSPADM

## 2024-03-01 RX ADMIN — ONDANSETRON 4 MG: 2 INJECTION INTRAMUSCULAR; INTRAVENOUS at 16:43

## 2024-03-01 RX ADMIN — BUPIVACAINE HYDROCHLORIDE IN DEXTROSE 12 MG: 7.5 INJECTION, SOLUTION SUBARACHNOID at 10:15

## 2024-03-01 RX ADMIN — SODIUM CHLORIDE, POTASSIUM CHLORIDE, SODIUM LACTATE AND CALCIUM CHLORIDE: 600; 310; 30; 20 INJECTION, SOLUTION INTRAVENOUS at 15:44

## 2024-03-01 RX ADMIN — MORPHINE SULFATE 0.1 MG: 1 INJECTION EPIDURAL; INTRATHECAL; INTRAVENOUS at 10:15

## 2024-03-01 RX ADMIN — ONDANSETRON HYDROCHLORIDE 4 MG: 2 INJECTION, SOLUTION INTRAMUSCULAR; INTRAVENOUS at 10:19

## 2024-03-01 RX ADMIN — PHENYLEPHRINE HYDROCHLORIDE 80 MCG/MIN: 10 INJECTION INTRAVENOUS at 10:16

## 2024-03-01 RX ADMIN — KETOROLAC TROMETHAMINE 30 MG: 30 INJECTION, SOLUTION INTRAMUSCULAR; INTRAVENOUS at 11:25

## 2024-03-01 RX ADMIN — WATER 2000 MG: 1 INJECTION INTRAMUSCULAR; INTRAVENOUS; SUBCUTANEOUS at 10:03

## 2024-03-01 RX ADMIN — SODIUM CHLORIDE, POTASSIUM CHLORIDE, SODIUM LACTATE AND CALCIUM CHLORIDE: 600; 310; 30; 20 INJECTION, SOLUTION INTRAVENOUS at 10:10

## 2024-03-01 RX ADMIN — ACETAMINOPHEN 975 MG: 325 TABLET ORAL at 09:40

## 2024-03-01 RX ADMIN — Medication 909 MILLI-UNITS/MIN: at 10:52

## 2024-03-01 RX ADMIN — SODIUM CHLORIDE, POTASSIUM CHLORIDE, SODIUM LACTATE AND CALCIUM CHLORIDE: 600; 310; 30; 20 INJECTION, SOLUTION INTRAVENOUS at 09:05

## 2024-03-01 RX ADMIN — SODIUM CHLORIDE, PRESERVATIVE FREE 10 ML: 5 INJECTION INTRAVENOUS at 08:55

## 2024-03-01 RX ADMIN — KETOROLAC TROMETHAMINE 30 MG: 30 INJECTION, SOLUTION INTRAMUSCULAR; INTRAVENOUS at 18:14

## 2024-03-01 RX ADMIN — FAMOTIDINE 20 MG: 10 INJECTION, SOLUTION INTRAVENOUS at 09:42

## 2024-03-01 RX ADMIN — FENTANYL CITRATE 20 MCG: 50 INJECTION, SOLUTION INTRAMUSCULAR; INTRAVENOUS at 10:15

## 2024-03-01 RX ADMIN — SODIUM CHLORIDE, POTASSIUM CHLORIDE, SODIUM LACTATE AND CALCIUM CHLORIDE: 600; 310; 30; 20 INJECTION, SOLUTION INTRAVENOUS at 10:42

## 2024-03-01 ASSESSMENT — PAIN SCALES - GENERAL
PAINLEVEL_OUTOF10: 0
PAINLEVEL_OUTOF10: 4

## 2024-03-01 ASSESSMENT — PAIN DESCRIPTION - LOCATION: LOCATION: INCISION

## 2024-03-01 ASSESSMENT — PAIN DESCRIPTION - DESCRIPTORS: DESCRIPTORS: SORE

## 2024-03-01 NOTE — DISCHARGE INSTRUCTIONS
POST DELIVERY DISCHARGE INSTRUCTIONS    Name: Avani Stern  YOB: 1990  Primary Diagnosis: s/p  section, GDMA1    General:     Diet/Diet Restrictions:  Eight 8-ounce glasses of fluid daily (water, juices); avoid excessive caffeine intake.  Meals/snacks as desired which are high in fiber and carbohydrates and low in fat and cholesterol.    Physical Activity / Restrictions / Safety:     Avoid heavy lifting, no more that 8 lbs. For 2-3 weeks;   Limit use of stairs to 2 times daily for the first week home.   No driving for one week.  Avoid intercourse 4-6 weeks, no douching or tampon use.   Check with obstetrician before starting or resuming an exercise program.      Discharge Instructions/Special Treatment/Home Care Needs:     Continue prenatal vitamins.  Continue to use squirt bottle with warm water on your episiotomy after each bathroom use until bleeding stops.  If steri-strips applied to your incision, remove in 7-10 days.    Call your doctor for the following:     Fever over 101 degrees by mouth.  Vaginal bleeding heavier than a normal menstrual period or clots larger than a golf ball.  Red streaks or increased swelling of legs, painful red streaks on your breast.  Painful urination, constipation and increased pain or swelling or discharge with your incision.  If you feel extremely anxious or overwhelmed.  If you have thoughts of harming yourself and/or your baby.    Pain Management:     Take Acetaminophen (Tylenol) or Ibuprofen (Advil, Motrin), as directed for pain.   Use a warm Sitz bath 3 times daily to relieve episiotomy or hemorrhoidal discomfort.   For hemorrhoidal discomfort, use Tucks and Anusol cream as needed and directed.  Heating pad to  incision as needed.     Follow-Up Care:     Appointment with MD: 6 weeks  Telephone number: [phone]    Signed By: Dayami Arteaga MD

## 2024-03-01 NOTE — ANESTHESIA PROCEDURE NOTES
Spinal Block    Patient location during procedure: OR  End time: 3/1/2024 10:15 AM  Reason for block: primary anesthetic  Staffing  Performed: anesthesiologist   Performed by: Antonio Abdi MD  Authorized by: Antonio Abdi MD    Spinal Block  Patient position: sitting  Prep: Betadine  Patient monitoring: continuous pulse ox, oxygen, frequent blood pressure checks, continuous capnometry and cardiac monitor  Approach: midline  Location: L2/L3  Provider prep: mask and sterile gloves  Needle  Needle type: pencil-tip   Needle gauge: 25 G  Needle length: 3.5 in  Assessment  Sensory level: T6  Swirl obtained: Yes  CSF: clear  Attempts: 1  Hemodynamics: stable  Preanesthetic Checklist  Completed: patient identified, IV checked, site marked, risks and benefits discussed, surgical/procedural consents, equipment checked, pre-op evaluation, timeout performed, anesthesia consent given, oxygen available, monitors applied/VS acknowledged, fire risk safety assessment completed and verbalized and blood product R/B/A discussed and consented

## 2024-03-01 NOTE — ANESTHESIA POSTPROCEDURE EVALUATION
Department of Anesthesiology  Postprocedure Note    Patient: Avani Stern  MRN: [id number]  YOB: 1990  Date of evaluation: 3/1/2024    Procedure Summary       Date: 24 Room / [facility]: [facility]    Anesthesia Start: 1010 Anesthesia Stop: 1157    Procedure:  SECTION (E R A S) (Abdomen) Diagnosis:       Delivery by  section using transverse incision of lower segment of uterus      (Delivery by  section using transverse incision of lower segment of uterus [O82])    Surgeons: Dayami Arteaga MD Responsible Provider: Antonio Abdi MD    Anesthesia Type: Spinal ASA Status: 2            Anesthesia Type: Spinal    Breanna Phase I: Breanna Score: 9    Breanna Phase II:      Anesthesia Post Evaluation  Post-Anesthesia Evaluation and Assessment    Patient: Avani Stern MRN: [id number]  SSN: [id number]    YOB: 1990  Age: 33 y.o.  Sex: female      I have evaluated the patient and they are stable and ready for discharge from the PACU.     Cardiovascular Function/Vital Signs  Visit Vitals  BP (!) 102/57   Pulse 81   Temp 97.7 °F (36.5 °C) (Oral)   Resp 16   Ht 1.575 m (5' 2\")   Wt 82.1 kg (181 lb)   SpO2 100%   Breastfeeding Unknown   BMI 33.11 kg/m²       Patient is status post Spinal anesthesia for Procedure(s):   SECTION (E R A S).    Nausea/Vomiting: Mild    Postoperative hydration reviewed and adequate.    Pain:      Managed    Neurological Status:       Block resolving    Mental Status, Level of Consciousness: Alert and  oriented to person, place, and time    Pulmonary Status:       Adequate oxygenation and airway patent    Complications related to anesthesia: None    Post-anesthesia assessment completed. No concerns    Signed By: Antonio Abdi MD     2024                No notable events documented.
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
## 2024-03-01 NOTE — LACTATION NOTE
This note was copied from a baby's chart.  Mother has breast fed two previous children exclusively both for over a year. Mother noticed colostrum leaking from both breasts early in the pregnancy. Latched baby to the left breast in the cross cradle position. Baby latched deeply and audible swallows were heard. Educated mother of feeding cues, and feeding baby at least every 2 to 3 hours. Baby is LGA and blood sugars are 56, 56, and 65. Mother will not limit time at the breast and offer both breasts at each feeding.    Feeding Plan:  Mother will keep baby skin to skin as often as possible, feed on demand, 8-12x/day , respond to feeding cues, obtain latch, listen for audible swallowing, be aware of signs of oxytocin release/ cramping,thirst,sleepiness while breastfeeding, offer both breasts,and will not limit feedings.  Mother agrees to utilize breast massage while nursing to facilitate lactogenesis.

## 2024-03-01 NOTE — OP NOTE
Operative Note  Patient - Avani Stern  Medical Record Number - [id number]   YOB: 1990      DATE AND TIME OF PROCEDURE:   3/1/2024  10:00am    PREOPERATIVE DIAGNOSIS:   H/o  section x 2  GDMA1    POSTOPERATIVE DIAGNOSIS:   1) H/o  section x 2 now s/p RLTCS and scar revision  2) GDMA1  3) Uterine window noted at the time of  section    PROCEDURE(S): Procedure(s):   SECTION (E R A S)     ANESTHESIA: Spinal    SURGEON:  Dayami Arteaga MD    ASSISTANT: Simón Huddleston MD    QUANTITATIVE BLOOD LOSS AT PROCEDURE END: 1325 mL     COMPLICATIONS: none    IMPLANTS: *No implants in the log*    SPECIMENS: None    FINDINGS: Uterine window noted on entry, moderate amount of scar tissue      Prophylactic Antibiotics: Ancef    DVT Prophylaxis: Sequential Compression Devices         Fetal Description: mckay     Birth Information:   Information for the patient's :  Cheryle Stern [[id number]]   @[id number]@     Umbilical Cord: Nuchal Cord x  1    Placenta:  spontaneous        Procedure Detail:      After proper patient identification and consent, the patient was taken to the operating room, where spinal anesthesia was administered and found to be adequate. Dorsey catheter had been placed using sterile technique.  The patient was prepped and draped in the normal sterile fashion.The abdomen was entered using the Pfannenstiel technique. The peritoneum was entered sharply and bluntly well superior to the bladder without any apparent injury. An andrade retractor was placed.  The bladder flap was created without difficulty. A low transverse uterine incision was made with the scalpel and extended with blunt finger dissection. Amniotomy was performed and the fluid was medium amount clear.  The baby’s head was then delivered atraumatically. The nose and mouth were suctioned. The cord was clamped and cut and the baby was handed off to Nursing staff in attendance.

## 2024-03-01 NOTE — L&D DELIVERY NOTE
Uncomplicated RLTCS for h/o  section x 2.  Uterine window noted on entry.  See operative note for details.      Cheryle Stern [[id number]]      Labor Events     Steroids: None  Cervical Ripening Date/Time:      Antibiotics Received during Labor: No  Rupture Date/Time:  3/1/24 10:49:00   Rupture Type: AROM  Fluid Color: Meconium  Meconium Consistency: Thin  Fluid Odor: None  Fluid Volume: Large  Induction: None  Augmentation: None  Labor Complications: None              Anesthesia    Method: Spinal       Labor Length    3rd stage: 0h 02m       Delivery Details      Delivery Date: 3/1/24 Delivery Time: 10:50:00   Delivery Type: , Low Transverse  Trial of Labor?: No   Categorization: Repeat   Priority: scheduled  Indications for : Prior Uterine Surgery       Skin Incision Type: Pfannenstiel  Uterine Incision: Low Transverse       [place] Presentation    Presentation: Vertex       Shoulder Dystocia    Shoulder Dystocia Present?: No       Assisted Delivery Details    Forceps Attempted?: No  Vacuum Extractor Attempted?: No                           Cord    Vessels: 3 Vessels  Complications: Nuchal Loose  Cord Around: Head  Delayed Cord Clamping?: Yes  Cord Clamped Date/Time: 3/1/2024 10:51:00  Cord Blood Disposition: Lab  Gases Sent?: No              Placenta    Date/Time: 3/1/2024 10:52:00  Removal: Expressed  Appearance: Intact  Disposition: Discarded       Lacerations    Episiotomy: None  Perineal Lacerations: None  Other Lacerations: no non-perineal laceration       Blood Loss  Mother: Avani Stern R #[id number]     Start of Mother's Information      Delivery Blood Loss  24 1010 - 24 1444      Quantitative Blood Loss (mL) Hospital Encounter 1325 grams    Total  1325 mL               End of Mother's Information  Mother: Avani Stern R #[id number]                Delivery Providers    Delivering clinician: Dayami Arteaga MD     Provider Role

## 2024-03-01 NOTE — PROGRESS NOTES
0806 Patient received ambulatory to R#12 for scheduled repeat  section under services of Dr. Arteaga  0810 Patient removed abdominal /pubic hair at home, CHG wipes done  0811 External fetal monitor applied  0850 IV inserted #20 g to LFA , site intact  0853 POC glucose 83  0930 Dr. Abdi at bedside  0940 Dr. Arteaga at bedside, Tylenol 975 mg PO given  0942 Pepcid 20 mg IV given  1003 Cefazolin 2 grams IVPB given  1007 External fetal monitor off, transferred to OR#2 ambulatory for repeat  section  1155 Returned to LDR #12 for PACU recovery  1320 Partial bed bath given, breakthrough drainage noted on abdominal dressing, circled and timed  1330 Dr. Arteaga notified of QBL and breakthrough drainage noted on abdominal dressing, ok to transfer to [facility]U.  1350 Dorsey catheter  emptied for 400 ml clear yellow urine  1410 TRANSFER - OUT REPORT:    Bedside and Verbal report given to KRISTIE Harper on Avani Stern  being transferred to room 336 on MIU via hospital bed for routine progression of patient care       Report consisted of patient's Situation, Background, Assessment and   Recommendations(SBAR).     Information from the following report(s) Nurse Handoff Report, Intake/Output, MAR, and Recent Results was reviewed with the receiving nurse.           Lines:   Peripheral IV 24 Distal;Left;Posterior Forearm (Active)   Site Assessment Clean, dry & intact 24 0856   Line Status Normal saline locked 24 0856   Line Care Connections checked and tightened 24 0856   Phlebitis Assessment No symptoms 24 0856   Infiltration Assessment 0 24 0856   Dressing Status New dressing applied;Clean, dry & intact 24 0856   Dressing Type Transparent;Other (Comment) 24 0856        Opportunity for questions and clarification was provided.      Patient transported via hospital bed with:  Registered Nurse

## 2024-03-02 LAB
ERYTHROCYTE [DISTWIDTH] IN BLOOD BY AUTOMATED COUNT: 14.7 % (ref 11.5–14.5)
HCT VFR BLD AUTO: 28 % (ref 35–47)
HGB BLD-MCNC: 9.8 G/DL (ref 11.5–16)
MCH RBC QN AUTO: 33 PG (ref 26–34)
MCHC RBC AUTO-ENTMCNC: 35 G/DL (ref 30–36.5)
MCV RBC AUTO: 94.3 FL (ref 80–99)
NRBC # BLD: 0 K/UL (ref 0–0.01)
NRBC BLD-RTO: 0 PER 100 WBC
PLATELET # BLD AUTO: 189 K/UL (ref 150–400)
PMV BLD AUTO: 9.7 FL (ref 8.9–12.9)
RBC # BLD AUTO: 2.97 M/UL (ref 3.8–5.2)
WBC # BLD AUTO: 11.4 K/UL (ref 3.6–11)

## 2024-03-02 PROCEDURE — 1120000000 HC RM PRIVATE OB

## 2024-03-02 PROCEDURE — 85027 COMPLETE CBC AUTOMATED: CPT

## 2024-03-02 PROCEDURE — 6360000002 HC RX W HCPCS: Performed by: ANESTHESIOLOGY

## 2024-03-02 PROCEDURE — 6370000000 HC RX 637 (ALT 250 FOR IP): Performed by: OBSTETRICS & GYNECOLOGY

## 2024-03-02 PROCEDURE — 2580000003 HC RX 258: Performed by: OBSTETRICS & GYNECOLOGY

## 2024-03-02 PROCEDURE — 36415 COLL VENOUS BLD VENIPUNCTURE: CPT

## 2024-03-02 RX ADMIN — ACETAMINOPHEN 1000 MG: 500 TABLET ORAL at 10:26

## 2024-03-02 RX ADMIN — SODIUM CHLORIDE, PRESERVATIVE FREE 10 ML: 5 INJECTION INTRAVENOUS at 06:18

## 2024-03-02 RX ADMIN — KETOROLAC TROMETHAMINE 30 MG: 30 INJECTION, SOLUTION INTRAMUSCULAR; INTRAVENOUS at 00:10

## 2024-03-02 RX ADMIN — IBUPROFEN 800 MG: 400 TABLET ORAL at 14:07

## 2024-03-02 RX ADMIN — Medication 1 TABLET: at 10:27

## 2024-03-02 RX ADMIN — ACETAMINOPHEN 1000 MG: 500 TABLET ORAL at 18:12

## 2024-03-02 RX ADMIN — IBUPROFEN 800 MG: 400 TABLET ORAL at 21:53

## 2024-03-02 RX ADMIN — DOCUSATE SODIUM 100 MG: 100 CAPSULE, LIQUID FILLED ORAL at 21:05

## 2024-03-02 RX ADMIN — KETOROLAC TROMETHAMINE 30 MG: 30 INJECTION, SOLUTION INTRAMUSCULAR; INTRAVENOUS at 06:15

## 2024-03-02 RX ADMIN — DOCUSATE SODIUM 100 MG: 100 CAPSULE, LIQUID FILLED ORAL at 10:27

## 2024-03-02 ASSESSMENT — PAIN - FUNCTIONAL ASSESSMENT
PAIN_FUNCTIONAL_ASSESSMENT: ACTIVITIES ARE NOT PREVENTED

## 2024-03-02 ASSESSMENT — PAIN DESCRIPTION - LOCATION
LOCATION: ABDOMEN
LOCATION: INCISION
LOCATION: ABDOMEN;INCISION

## 2024-03-02 ASSESSMENT — PAIN SCALES - GENERAL
PAINLEVEL_OUTOF10: 6
PAINLEVEL_OUTOF10: 3
PAINLEVEL_OUTOF10: 5

## 2024-03-02 ASSESSMENT — PAIN DESCRIPTION - DESCRIPTORS
DESCRIPTORS: SORE

## 2024-03-02 ASSESSMENT — PAIN DESCRIPTION - ORIENTATION
ORIENTATION: LOWER;MID
ORIENTATION: LOWER;MID
ORIENTATION: MID;LOWER

## 2024-03-02 NOTE — PROGRESS NOTES
Post-Operative  Day 1    Avani Stern     Assessment:   Post-Op day 1, stable  Acute surgical blood loss anemia: Hgb 11.5 -> 9.8, asymptomatic, good UOP, home on iron at d/c  GDMA1  Baby girl    Plan:     - Routine post-operative care.  - N/A girl  - Postop hemoglobin stable.  Plan to start Fe at discharge if pt anemic.  - Ambulate today.      Information for the patient's :  Jarred, Girl Avani [[id number]]   , Low Transverse   Patient doing well without significant complaint.  Tolerating diet.  Dorsey out.  Ambulating.      Vitals:  /69   Pulse 77   Temp 97.9 °F (36.6 °C) (Oral)   Resp 16   Ht 1.575 m (5' 2\")   Wt 82.1 kg (181 lb)   SpO2 100%   Breastfeeding Unknown   BMI 33.11 kg/m²   Temp (24hrs), Av.8 °F (36.6 °C), Min:97.4 °F (36.3 °C), Max:98.2 °F (36.8 °C)      Last 24hr Input/Output:    Intake/Output Summary (Last 24 hours) at 3/2/2024 0751  Last data filed at 3/1/2024 2200  Gross per 24 hour   Intake 1700 ml   Output 3225 ml   Net -1525 ml          Exam:     Patient without distress.               Fundus firm, nontender per nursing fundal checks.  Incision bandaged, clean, dry, intact.              Perineum with normal lochia noted per nursing assessment.              Lower extremities are negative for pathological edema.    Labs:   Lab Results   Component Value Date/Time    WBC 11.4 2024 06:17 AM    WBC 9.9 2024 09:53 AM    HGB 9.8 2024 06:17 AM    HGB 11.5 2024 09:53 AM    HCT 28.0 2024 06:17 AM    HCT 33.6 2024 09:53 AM     2024 06:17 AM     2024 09:53 AM       Recent Results (from the past 24 hour(s))   POCT Glucose    Collection Time: 24  8:53 AM   Result Value Ref Range    POC Glucose 82 65 - 117 mg/dL    Performed by: SEAVER Darla    CBC    Collection Time: 24  6:17 AM   Result Value Ref Range    WBC 11.4 (H) 3.6 - 11.0 K/uL    RBC 2.97 (L) 3.80 - 5.20 M/uL    Hemoglobin 9.8 (L)

## 2024-03-02 NOTE — LACTATION NOTE
This note was copied from a baby's chart.  Infant finishing a feeding at the time of my visit. Per mom, infant has been nursing for 30 minutes. Mom has easily expressed colostrum. Infant noted to have a 7.3% weight loss at 24 hours and has had 5 voids and 5 stools since birth. Mom has visitors at this time and recommend that she call for assistance with hand expression to further facilitate lactogenesis II. She agrees to do so.    1500 Mom about to latch infant at the time of my visit. Helped mom with latching in the football position; deep latch obtained and swallows heard. Hand expressed 1.5 ml from opposite breast as infant nurses. After nursing on the 2nd breast, mom will pump using the hospital grade pump to further stimulate production, in light of infant's weight loss of 7.3%. Any EBM obtained will be syringe fed to infant. Instructions for pump use and cleaning provided.

## 2024-03-03 VITALS
HEIGHT: 62 IN | BODY MASS INDEX: 33.31 KG/M2 | RESPIRATION RATE: 16 BRPM | WEIGHT: 181 LBS | TEMPERATURE: 98 F | OXYGEN SATURATION: 97 % | HEART RATE: 87 BPM | DIASTOLIC BLOOD PRESSURE: 76 MMHG | SYSTOLIC BLOOD PRESSURE: 104 MMHG

## 2024-03-03 PROBLEM — Z98.891 S/P CESAREAN SECTION: Status: ACTIVE | Noted: 2018-11-02

## 2024-03-03 PROCEDURE — 6370000000 HC RX 637 (ALT 250 FOR IP): Performed by: OBSTETRICS & GYNECOLOGY

## 2024-03-03 RX ORDER — PSEUDOEPHEDRINE HCL 30 MG
100 TABLET ORAL 2 TIMES DAILY PRN
Qty: 60 CAPSULE | Refills: 1 | Status: SHIPPED | OUTPATIENT
Start: 2024-03-03

## 2024-03-03 RX ORDER — OXYCODONE HYDROCHLORIDE 5 MG/1
5 TABLET ORAL EVERY 6 HOURS PRN
Qty: 18 TABLET | Refills: 0 | Status: SHIPPED | OUTPATIENT
Start: 2024-03-03 | End: 2024-03-10

## 2024-03-03 RX ORDER — IBUPROFEN 800 MG/1
800 TABLET ORAL EVERY 8 HOURS PRN
Qty: 50 TABLET | Refills: 1 | Status: SHIPPED | OUTPATIENT
Start: 2024-03-03

## 2024-03-03 RX ORDER — FERROUS SULFATE 325(65) MG
325 TABLET ORAL
Qty: 60 TABLET | Refills: 0 | Status: SHIPPED | OUTPATIENT
Start: 2024-03-03

## 2024-03-03 RX ADMIN — DOCUSATE SODIUM 100 MG: 100 CAPSULE, LIQUID FILLED ORAL at 08:23

## 2024-03-03 RX ADMIN — Medication 1 TABLET: at 08:23

## 2024-03-03 RX ADMIN — ACETAMINOPHEN 1000 MG: 500 TABLET ORAL at 01:29

## 2024-03-03 RX ADMIN — IBUPROFEN 800 MG: 400 TABLET ORAL at 05:59

## 2024-03-03 RX ADMIN — ACETAMINOPHEN 1000 MG: 500 TABLET ORAL at 09:14

## 2024-03-03 ASSESSMENT — PAIN - FUNCTIONAL ASSESSMENT
PAIN_FUNCTIONAL_ASSESSMENT: ACTIVITIES ARE NOT PREVENTED
PAIN_FUNCTIONAL_ASSESSMENT: ACTIVITIES ARE NOT PREVENTED

## 2024-03-03 ASSESSMENT — PAIN DESCRIPTION - ORIENTATION
ORIENTATION: LOWER;MID
ORIENTATION: LOWER;MID

## 2024-03-03 ASSESSMENT — PAIN SCALES - GENERAL
PAINLEVEL_OUTOF10: 5
PAINLEVEL_OUTOF10: 6

## 2024-03-03 ASSESSMENT — PAIN DESCRIPTION - DESCRIPTORS
DESCRIPTORS: SORE
DESCRIPTORS: SORE

## 2024-03-03 ASSESSMENT — PAIN DESCRIPTION - LOCATION
LOCATION: ABDOMEN;INCISION
LOCATION: ABDOMEN;INCISION

## 2024-03-03 NOTE — LACTATION NOTE
This note was copied from a baby's chart.  Baby nursing well and has improved throughout post partum stay, deep latch maintained, mother is comfortable, milk is in transition, baby feeding vigorously with rhythmic suck, swallow, breathe pattern, with audible swallowing, and evident milk transfer, both breasts offered, baby is asleep following feeding. Mom pumped following nursing during the night/this morning and obtained 5-8 ml and syringe fed it to the infant following nursing. She states she feels her breasts getting heavy. Baby is feeding on demand, voiding and stools present as appropriate since birth. Weight loss:  8.6%. Recommend that mom continue putting infant to the breast at least every 3 hours or in response to feeding cues.

## 2024-03-03 NOTE — PROGRESS NOTES
Post-Operative  Day 2    Avani Stern     Assessment:   Post-Op day 2 s/p RLTCS, doing well  Acute surgical blood loss anemia: Hgb 11.5 -> 9.8, asymptomatic, good UOP, home on iron at d/c  GDMA1  Baby girl    Plan:   - Routine post-operative care.  - Plan for discharge Today per pt request as long as baby is also cleared for d/c; pt is passing all discharge milestones.    Information for the patient's :  Jarred Girl Avani [[id number]]   , Low Transverse   Patient doing well without significant complaint. Tolerating regular diet.  Ambulating.  Voiding without difficulty.    Vitals:  /72   Pulse 74   Temp 97.9 °F (36.6 °C) (Oral)   Resp 16   Ht 1.575 m (5' 2\")   Wt 82.1 kg (181 lb)   SpO2 98%   Breastfeeding Unknown   BMI 33.11 kg/m²   Temp (24hrs), Av.2 °F (36.8 °C), Min:97.9 °F (36.6 °C), Max:98.6 °F (37 °C)        Exam:       Patient without distress.                 Fundus firm, nontender per nursing fundal checks.     Incision bandaged. Clean, dry, intact.                Perineum with normal lochia noted per nursing assessment.                Lower extremities are negative for pathological edema.    Labs:   Lab Results   Component Value Date/Time    WBC 11.4 2024 06:17 AM    WBC 9.9 2024 09:53 AM    HGB 9.8 2024 06:17 AM    HGB 11.5 2024 09:53 AM    HCT 28.0 2024 06:17 AM    HCT 33.6 2024 09:53 AM     2024 06:17 AM     2024 09:53 AM       No results found for this or any previous visit (from the past 24 hour(s)).

## 2024-03-03 NOTE — DISCHARGE SUMMARY
(PERCOCET) 5-325 MG per tablet Comments:   Reason for Stopping:               Disposition at Discharge: Home or self care    Condition at Discharge: Stable    Reference my discharge instructions.    No follow-ups on file.     Signed By:  Dayami Arteaga MD     March 3, 2024

## (undated) DEVICE — LARGE, DISPOSABLE ALEXIS O C-SECTION PROTECTOR - RETRACTOR: Brand: ALEXIS ® O C-SECTION PROTECTOR - RETRACTOR

## (undated) DEVICE — ATTACHMENT SMK 3/8INX10FT VALLEYLAB

## (undated) DEVICE — SUTURE 2-0 L36IN ABSRB BRN CT L40MM 1/2 CIR TAPERPOINT SGL 913H

## (undated) DEVICE — SUTURE ABSRB BRAID COAT UD CT NO 1 36IN VCRL J959H

## (undated) DEVICE — 3000CC GUARDIAN II: Brand: GUARDIAN

## (undated) DEVICE — C-SECTION II-LF: Brand: MEDLINE INDUSTRIES, INC.

## (undated) DEVICE — DRAPE FLD WRM W44XL66IN C6L FOR INTRATEMP SYS THERMABASIN

## (undated) DEVICE — TIP CLEANER: Brand: VALLEYLAB

## (undated) DEVICE — STERILE POLYISOPRENE POWDER-FREE SURGICAL GLOVES: Brand: PROTEXIS

## (undated) DEVICE — SUTURE PLN GUT SZ 2-0 L27IN ABSRB YELLOWISH TAN L70MM XLH 53T

## (undated) DEVICE — SUTURE MCRYL SZ 0 L36IN ABSRB UD L36MM CT-1 1/2 CIR Y946H

## (undated) DEVICE — REM POLYHESIVE ADULT PATIENT RETURN ELECTRODE: Brand: VALLEYLAB

## (undated) DEVICE — BULB SYRINGE, IRRIGATION WITH PROTECTIVE CAP, 60 CC, INDIVIDUALLY WRAPPED: Brand: DOVER

## (undated) DEVICE — (D)STRIP SKN CLSR 0.5X4IN WHT --

## (undated) DEVICE — STAPLER SKIN SQ 30 ABSRB STPL DISP INSORB

## (undated) DEVICE — SUTURE SZ0 L36IN VIO ABSORB CT-1 NDL HALF CIR MONOCRYL PLUS

## (undated) DEVICE — MEDI-VAC NON-CONDUCTIVE SUCTION TUBING: Brand: CARDINAL HEALTH

## (undated) DEVICE — ROCKER SWITCH PENCIL HOLSTER: Brand: VALLEYLAB

## (undated) DEVICE — SUTURE MCRYL + SZ 2-0 L36IN ABSRB UD CT-1 L36MM 1/2 CIR MCP945H

## (undated) DEVICE — CATHETER URIN 16FR 30CC BLLN 2 W F LUBRI-SIL IC

## (undated) DEVICE — SOLUTION IV 1000ML 0.9% SOD CHL

## (undated) DEVICE — TRAY PREP DRY W/ PREM GLV 2 APPL 6 SPNG 2 UNDPD 1 OVERWRAP

## (undated) DEVICE — SOLUTION IRRIG 1000ML 0.9% SOD CHL USP POUR PLAS BTL

## (undated) DEVICE — DEVICE ES L3M EDGE COAT HEX LOK BLDE ELECTRD HOLSTER FORC

## (undated) DEVICE — PACK PROCEDURE SURG C SECT KT SMH

## (undated) DEVICE — SOLIDIFIER FLUID 3000 CC ABSORB

## (undated) DEVICE — APPLICATOR MEDICATED 26 CC SOLUTION HI LT ORNG CHLORAPREP

## (undated) DEVICE — TRAY CATH OD16FR SIL URIN M STATLOK STBL DEV SURSTP

## (undated) DEVICE — 3M™ MEDIPORE™ H SOFT CLOTH SURGICAL TAPE, 2863, 3 IN X 10 YD, 12/CASE: Brand: 3M™ MEDIPORE™

## (undated) DEVICE — STAPLER SKIN SQ 30 ABSRB STPL DISP INSORB ORDER VIA PHONE OR EMAIL

## (undated) DEVICE — TOWEL,OR,DSP,ST,BLUE,STD,2/PK,40PK/CS: Brand: MEDLINE

## (undated) DEVICE — ELECTRODE PT RET AD L9FT HI MOIST COND ADH HYDRGEL CORDED

## (undated) DEVICE — (D)PREP SKN CHLRAPRP APPL 26ML -- CONVERT TO ITEM 371833

## (undated) DEVICE — SUTURE VCRL + SZ 0 L36IN ABSRB VLT L40MM CT 1/2 CIR TAPR VCP358H

## (undated) DEVICE — KENDALL SCD EXPRESS SLEEVES, KNEE LENGTH, MEDIUM: Brand: KENDALL SCD

## (undated) DEVICE — DEVON™ KNEE AND BODY STRAP 60" X 3" (1.5 M X 7.6 CM): Brand: DEVON

## (undated) DEVICE — STERILE POLYISOPRENE POWDER-FREE SURGICAL GLOVES WITH EMOLLIENT COATING: Brand: PROTEXIS

## (undated) DEVICE — LIGHT HANDLE: Brand: DEVON

## (undated) DEVICE — BLADE ASSEMB CLP HAIR FINE --

## (undated) DEVICE — SOLUTION IRRIG 1000ML STRL H2O USP PLAS POUR BTL

## (undated) DEVICE — ABDOMINAL PAD: Brand: DERMACEA

## (undated) DEVICE — DRESSING SIL W4XL5IN ANTIBACT GELLING FBR CYTOFORM

## (undated) DEVICE — COVERALLS PROTCT 2XL WHT SMS ANTISTATIC PREM KNIT CUF FULL

## (undated) DEVICE — MASTISOL ADHESIVE LIQ 2/3ML